# Patient Record
Sex: MALE | Race: WHITE | NOT HISPANIC OR LATINO | Employment: FULL TIME | URBAN - METROPOLITAN AREA
[De-identification: names, ages, dates, MRNs, and addresses within clinical notes are randomized per-mention and may not be internally consistent; named-entity substitution may affect disease eponyms.]

---

## 2018-04-16 PROBLEM — I10 ESSENTIAL HYPERTENSION: Status: ACTIVE | Noted: 2018-04-16

## 2020-02-04 PROBLEM — E66.811 CLASS 1 OBESITY DUE TO EXCESS CALORIES WITH SERIOUS COMORBIDITY AND BODY MASS INDEX (BMI) OF 30.0 TO 30.9 IN ADULT: Status: ACTIVE | Noted: 2020-02-04

## 2020-02-04 PROBLEM — F98.8 ATTENTION DEFICIT DISORDER (ADD) WITHOUT HYPERACTIVITY: Status: ACTIVE | Noted: 2020-02-04

## 2020-02-04 PROBLEM — Z87.891 PERSONAL HISTORY OF TOBACCO USE, PRESENTING HAZARDS TO HEALTH: Status: ACTIVE | Noted: 2020-02-04

## 2020-02-04 PROBLEM — N52.9 ERECTILE DYSFUNCTION: Status: ACTIVE | Noted: 2020-02-04

## 2020-02-04 PROBLEM — E66.09 CLASS 1 OBESITY DUE TO EXCESS CALORIES WITH SERIOUS COMORBIDITY AND BODY MASS INDEX (BMI) OF 30.0 TO 30.9 IN ADULT: Status: ACTIVE | Noted: 2020-02-04

## 2021-05-11 PROBLEM — M19.012 PRIMARY OSTEOARTHRITIS, LEFT SHOULDER: Status: ACTIVE | Noted: 2021-05-11

## 2022-05-09 PROBLEM — K40.90 LEFT INGUINAL HERNIA: Status: ACTIVE | Noted: 2022-05-09

## 2023-05-18 PROBLEM — E83.51 LOW CALCIUM LEVELS: Status: ACTIVE | Noted: 2023-05-18

## 2023-05-18 PROBLEM — D64.9 ANEMIA: Status: ACTIVE | Noted: 2023-05-18

## 2023-06-22 ENCOUNTER — TELEPHONE (OUTPATIENT)
Dept: HEMATOLOGY/ONCOLOGY | Facility: CLINIC | Age: 43
End: 2023-06-22
Payer: COMMERCIAL

## 2023-06-22 NOTE — TELEPHONE ENCOUNTER
Called pt and scheduled hem appt from referral.  Pt confirmed the appt and said will see in portal for address.

## 2023-06-30 ENCOUNTER — TELEPHONE (OUTPATIENT)
Dept: HEMATOLOGY/ONCOLOGY | Facility: CLINIC | Age: 43
End: 2023-06-30
Payer: COMMERCIAL

## 2023-06-30 NOTE — TELEPHONE ENCOUNTER
No voice msg set up, unable to lvm to inform the pt Dr Burch will not be in clinic for his July 7th appt.  Attempt to reach the pt to rs with another provider for a differ time and date.  Pt portal msg also sent.     Pt returned call and rescheduled hem appt.  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    Rescheduled with Dr Felipe July 11th at 1:20.  Pt confirmed the new appt date time and location.

## 2023-07-11 ENCOUNTER — LAB VISIT (OUTPATIENT)
Dept: LAB | Facility: HOSPITAL | Age: 43
End: 2023-07-11
Attending: INTERNAL MEDICINE
Payer: COMMERCIAL

## 2023-07-11 ENCOUNTER — OFFICE VISIT (OUTPATIENT)
Dept: HEMATOLOGY/ONCOLOGY | Facility: CLINIC | Age: 43
End: 2023-07-11
Payer: COMMERCIAL

## 2023-07-11 ENCOUNTER — TELEPHONE (OUTPATIENT)
Dept: HEMATOLOGY/ONCOLOGY | Facility: CLINIC | Age: 43
End: 2023-07-11
Payer: COMMERCIAL

## 2023-07-11 VITALS
SYSTOLIC BLOOD PRESSURE: 140 MMHG | DIASTOLIC BLOOD PRESSURE: 82 MMHG | OXYGEN SATURATION: 96 % | HEART RATE: 99 BPM | RESPIRATION RATE: 16 BRPM | BODY MASS INDEX: 32.46 KG/M2 | TEMPERATURE: 97 F | WEIGHT: 206.81 LBS | HEIGHT: 67 IN

## 2023-07-11 DIAGNOSIS — D64.9 ANEMIA, UNSPECIFIED TYPE: ICD-10-CM

## 2023-07-11 DIAGNOSIS — R79.89 ELEVATED FERRITIN: Primary | ICD-10-CM

## 2023-07-11 DIAGNOSIS — R79.89 ELEVATED FERRITIN: ICD-10-CM

## 2023-07-11 DIAGNOSIS — F98.8 ATTENTION DEFICIT DISORDER (ADD) WITHOUT HYPERACTIVITY: ICD-10-CM

## 2023-07-11 DIAGNOSIS — E53.8 B12 DEFICIENCY DUE TO DIET: ICD-10-CM

## 2023-07-11 DIAGNOSIS — E66.09 CLASS 1 OBESITY DUE TO EXCESS CALORIES WITH SERIOUS COMORBIDITY AND BODY MASS INDEX (BMI) OF 31.0 TO 31.9 IN ADULT: ICD-10-CM

## 2023-07-11 LAB
ALBUMIN SERPL BCP-MCNC: 4 G/DL (ref 3.5–5.2)
ALP SERPL-CCNC: 58 U/L (ref 55–135)
ALT SERPL W/O P-5'-P-CCNC: 22 U/L (ref 10–44)
ANION GAP SERPL CALC-SCNC: 10 MMOL/L (ref 8–16)
AST SERPL-CCNC: 20 U/L (ref 10–40)
BASOPHILS # BLD AUTO: 0.06 K/UL (ref 0–0.2)
BASOPHILS NFR BLD: 1.2 % (ref 0–1.9)
BILIRUB SERPL-MCNC: 0.4 MG/DL (ref 0.1–1)
BUN SERPL-MCNC: 13 MG/DL (ref 6–20)
CALCIUM SERPL-MCNC: 9.4 MG/DL (ref 8.7–10.5)
CHLORIDE SERPL-SCNC: 101 MMOL/L (ref 95–110)
CO2 SERPL-SCNC: 26 MMOL/L (ref 23–29)
CREAT SERPL-MCNC: 1.2 MG/DL (ref 0.5–1.4)
DIFFERENTIAL METHOD: ABNORMAL
EOSINOPHIL # BLD AUTO: 0.2 K/UL (ref 0–0.5)
EOSINOPHIL NFR BLD: 3.5 % (ref 0–8)
ERYTHROCYTE [DISTWIDTH] IN BLOOD BY AUTOMATED COUNT: 12.6 % (ref 11.5–14.5)
EST. GFR  (NO RACE VARIABLE): >60 ML/MIN/1.73 M^2
FERRITIN SERPL-MCNC: 392 NG/ML (ref 20–300)
FOLATE SERPL-MCNC: 2.8 NG/ML (ref 4–24)
GLUCOSE SERPL-MCNC: 94 MG/DL (ref 70–110)
HAPTOGLOB SERPL-MCNC: 125 MG/DL (ref 30–250)
HCT VFR BLD AUTO: 39.6 % (ref 40–54)
HGB BLD-MCNC: 14.1 G/DL (ref 14–18)
IMM GRANULOCYTES # BLD AUTO: 0 K/UL (ref 0–0.04)
IMM GRANULOCYTES NFR BLD AUTO: 0 % (ref 0–0.5)
LDH SERPL L TO P-CCNC: 144 U/L (ref 110–260)
LYMPHOCYTES # BLD AUTO: 1.7 K/UL (ref 1–4.8)
LYMPHOCYTES NFR BLD: 34.2 % (ref 18–48)
MCH RBC QN AUTO: 32.9 PG (ref 27–31)
MCHC RBC AUTO-ENTMCNC: 35.6 G/DL (ref 32–36)
MCV RBC AUTO: 92 FL (ref 82–98)
MONOCYTES # BLD AUTO: 0.4 K/UL (ref 0.3–1)
MONOCYTES NFR BLD: 7.4 % (ref 4–15)
NEUTROPHILS # BLD AUTO: 2.6 K/UL (ref 1.8–7.7)
NEUTROPHILS NFR BLD: 53.7 % (ref 38–73)
NRBC BLD-RTO: 0 /100 WBC
PATH REV BLD -IMP: NORMAL
PLATELET # BLD AUTO: 428 K/UL (ref 150–450)
PMV BLD AUTO: 9.1 FL (ref 9.2–12.9)
POTASSIUM SERPL-SCNC: 4.7 MMOL/L (ref 3.5–5.1)
PROT SERPL-MCNC: 7.4 G/DL (ref 6–8.4)
RBC # BLD AUTO: 4.29 M/UL (ref 4.6–6.2)
RETICS/RBC NFR AUTO: 1.3 % (ref 0.4–2)
SODIUM SERPL-SCNC: 137 MMOL/L (ref 136–145)
VIT B12 SERPL-MCNC: <148 PG/ML (ref 210–950)
WBC # BLD AUTO: 4.89 K/UL (ref 3.9–12.7)

## 2023-07-11 PROCEDURE — 86334 PATHOLOGIST INTERPRETATION IFE: ICD-10-PCS | Mod: 26,,, | Performed by: PATHOLOGY

## 2023-07-11 PROCEDURE — 84630 ASSAY OF ZINC: CPT | Performed by: STUDENT IN AN ORGANIZED HEALTH CARE EDUCATION/TRAINING PROGRAM

## 2023-07-11 PROCEDURE — 3044F PR MOST RECENT HEMOGLOBIN A1C LEVEL <7.0%: ICD-10-PCS | Mod: CPTII,S$GLB,, | Performed by: STUDENT IN AN ORGANIZED HEALTH CARE EDUCATION/TRAINING PROGRAM

## 2023-07-11 PROCEDURE — 83521 IG LIGHT CHAINS FREE EACH: CPT | Mod: 59 | Performed by: STUDENT IN AN ORGANIZED HEALTH CARE EDUCATION/TRAINING PROGRAM

## 2023-07-11 PROCEDURE — 99204 PR OFFICE/OUTPT VISIT, NEW, LEVL IV, 45-59 MIN: ICD-10-PCS | Mod: S$GLB,,, | Performed by: STUDENT IN AN ORGANIZED HEALTH CARE EDUCATION/TRAINING PROGRAM

## 2023-07-11 PROCEDURE — 83010 ASSAY OF HAPTOGLOBIN QUANT: CPT | Performed by: STUDENT IN AN ORGANIZED HEALTH CARE EDUCATION/TRAINING PROGRAM

## 2023-07-11 PROCEDURE — 84165 PATHOLOGIST INTERPRETATION SPE: ICD-10-PCS | Mod: 26,,, | Performed by: PATHOLOGY

## 2023-07-11 PROCEDURE — 3079F DIAST BP 80-89 MM HG: CPT | Mod: CPTII,S$GLB,, | Performed by: STUDENT IN AN ORGANIZED HEALTH CARE EDUCATION/TRAINING PROGRAM

## 2023-07-11 PROCEDURE — 84165 PROTEIN E-PHORESIS SERUM: CPT | Performed by: STUDENT IN AN ORGANIZED HEALTH CARE EDUCATION/TRAINING PROGRAM

## 2023-07-11 PROCEDURE — 3077F PR MOST RECENT SYSTOLIC BLOOD PRESSURE >= 140 MM HG: ICD-10-PCS | Mod: CPTII,S$GLB,, | Performed by: STUDENT IN AN ORGANIZED HEALTH CARE EDUCATION/TRAINING PROGRAM

## 2023-07-11 PROCEDURE — 1159F MED LIST DOCD IN RCRD: CPT | Mod: CPTII,S$GLB,, | Performed by: STUDENT IN AN ORGANIZED HEALTH CARE EDUCATION/TRAINING PROGRAM

## 2023-07-11 PROCEDURE — 99999 PR PBB SHADOW E&M-EST. PATIENT-LVL V: CPT | Mod: PBBFAC,,, | Performed by: STUDENT IN AN ORGANIZED HEALTH CARE EDUCATION/TRAINING PROGRAM

## 2023-07-11 PROCEDURE — 3008F BODY MASS INDEX DOCD: CPT | Mod: CPTII,S$GLB,, | Performed by: STUDENT IN AN ORGANIZED HEALTH CARE EDUCATION/TRAINING PROGRAM

## 2023-07-11 PROCEDURE — 82746 ASSAY OF FOLIC ACID SERUM: CPT | Performed by: STUDENT IN AN ORGANIZED HEALTH CARE EDUCATION/TRAINING PROGRAM

## 2023-07-11 PROCEDURE — 81256 HFE GENE: CPT | Performed by: STUDENT IN AN ORGANIZED HEALTH CARE EDUCATION/TRAINING PROGRAM

## 2023-07-11 PROCEDURE — 83615 LACTATE (LD) (LDH) ENZYME: CPT | Mod: PN | Performed by: STUDENT IN AN ORGANIZED HEALTH CARE EDUCATION/TRAINING PROGRAM

## 2023-07-11 PROCEDURE — 86334 IMMUNOFIX E-PHORESIS SERUM: CPT | Performed by: STUDENT IN AN ORGANIZED HEALTH CARE EDUCATION/TRAINING PROGRAM

## 2023-07-11 PROCEDURE — 85025 COMPLETE CBC W/AUTO DIFF WBC: CPT | Mod: PN | Performed by: STUDENT IN AN ORGANIZED HEALTH CARE EDUCATION/TRAINING PROGRAM

## 2023-07-11 PROCEDURE — 4010F PR ACE/ARB THEARPY RXD/TAKEN: ICD-10-PCS | Mod: CPTII,S$GLB,, | Performed by: STUDENT IN AN ORGANIZED HEALTH CARE EDUCATION/TRAINING PROGRAM

## 2023-07-11 PROCEDURE — 84165 PROTEIN E-PHORESIS SERUM: CPT | Mod: 26,,, | Performed by: PATHOLOGY

## 2023-07-11 PROCEDURE — 85060 PATHOLOGIST REVIEW: ICD-10-PCS | Mod: ,,, | Performed by: PATHOLOGY

## 2023-07-11 PROCEDURE — 80053 COMPREHEN METABOLIC PANEL: CPT | Mod: PN | Performed by: STUDENT IN AN ORGANIZED HEALTH CARE EDUCATION/TRAINING PROGRAM

## 2023-07-11 PROCEDURE — 86334 IMMUNOFIX E-PHORESIS SERUM: CPT | Mod: 26,,, | Performed by: PATHOLOGY

## 2023-07-11 PROCEDURE — 4010F ACE/ARB THERAPY RXD/TAKEN: CPT | Mod: CPTII,S$GLB,, | Performed by: STUDENT IN AN ORGANIZED HEALTH CARE EDUCATION/TRAINING PROGRAM

## 2023-07-11 PROCEDURE — 1159F PR MEDICATION LIST DOCUMENTED IN MEDICAL RECORD: ICD-10-PCS | Mod: CPTII,S$GLB,, | Performed by: STUDENT IN AN ORGANIZED HEALTH CARE EDUCATION/TRAINING PROGRAM

## 2023-07-11 PROCEDURE — 99204 OFFICE O/P NEW MOD 45 MIN: CPT | Mod: S$GLB,,, | Performed by: STUDENT IN AN ORGANIZED HEALTH CARE EDUCATION/TRAINING PROGRAM

## 2023-07-11 PROCEDURE — 36415 COLL VENOUS BLD VENIPUNCTURE: CPT | Mod: PN | Performed by: STUDENT IN AN ORGANIZED HEALTH CARE EDUCATION/TRAINING PROGRAM

## 2023-07-11 PROCEDURE — 3077F SYST BP >= 140 MM HG: CPT | Mod: CPTII,S$GLB,, | Performed by: STUDENT IN AN ORGANIZED HEALTH CARE EDUCATION/TRAINING PROGRAM

## 2023-07-11 PROCEDURE — 82607 VITAMIN B-12: CPT | Performed by: STUDENT IN AN ORGANIZED HEALTH CARE EDUCATION/TRAINING PROGRAM

## 2023-07-11 PROCEDURE — 85060 BLOOD SMEAR INTERPRETATION: CPT | Mod: ,,, | Performed by: PATHOLOGY

## 2023-07-11 PROCEDURE — 85045 AUTOMATED RETICULOCYTE COUNT: CPT | Mod: PN | Performed by: STUDENT IN AN ORGANIZED HEALTH CARE EDUCATION/TRAINING PROGRAM

## 2023-07-11 PROCEDURE — 82728 ASSAY OF FERRITIN: CPT | Performed by: STUDENT IN AN ORGANIZED HEALTH CARE EDUCATION/TRAINING PROGRAM

## 2023-07-11 PROCEDURE — 3079F PR MOST RECENT DIASTOLIC BLOOD PRESSURE 80-89 MM HG: ICD-10-PCS | Mod: CPTII,S$GLB,, | Performed by: STUDENT IN AN ORGANIZED HEALTH CARE EDUCATION/TRAINING PROGRAM

## 2023-07-11 PROCEDURE — 3044F HG A1C LEVEL LT 7.0%: CPT | Mod: CPTII,S$GLB,, | Performed by: STUDENT IN AN ORGANIZED HEALTH CARE EDUCATION/TRAINING PROGRAM

## 2023-07-11 PROCEDURE — 82525 ASSAY OF COPPER: CPT | Performed by: STUDENT IN AN ORGANIZED HEALTH CARE EDUCATION/TRAINING PROGRAM

## 2023-07-11 PROCEDURE — 1160F RVW MEDS BY RX/DR IN RCRD: CPT | Mod: CPTII,S$GLB,, | Performed by: STUDENT IN AN ORGANIZED HEALTH CARE EDUCATION/TRAINING PROGRAM

## 2023-07-11 PROCEDURE — 1160F PR REVIEW ALL MEDS BY PRESCRIBER/CLIN PHARMACIST DOCUMENTED: ICD-10-PCS | Mod: CPTII,S$GLB,, | Performed by: STUDENT IN AN ORGANIZED HEALTH CARE EDUCATION/TRAINING PROGRAM

## 2023-07-11 PROCEDURE — 3008F PR BODY MASS INDEX (BMI) DOCUMENTED: ICD-10-PCS | Mod: CPTII,S$GLB,, | Performed by: STUDENT IN AN ORGANIZED HEALTH CARE EDUCATION/TRAINING PROGRAM

## 2023-07-11 PROCEDURE — 99999 PR PBB SHADOW E&M-EST. PATIENT-LVL V: ICD-10-PCS | Mod: PBBFAC,,, | Performed by: STUDENT IN AN ORGANIZED HEALTH CARE EDUCATION/TRAINING PROGRAM

## 2023-07-11 NOTE — PROGRESS NOTES
Subjective:                                                                                    Consult note   Patient ID:    Name: Jaylon Crocker  : 1980  MRN: [id number]      HPI:   Jaylon Crocker is a 42 y.o. male presents for evaluation of Other (Anemia /)    Patient was referred for anemia but with elevated ferritin, ~ 500's, on further discussion patient  is having generalized weakness and fatigued, we discussed in details his symptomsn, no bleeding, no bruising, but he does have a significant history of drinking alcohol every other week.     Past Medical History:   Diagnosis Date    ADHD (attention deficit hyperactivity disorder)     Hypertension        Past Surgical History:   Procedure Laterality Date    MYRINGOTOMY W/ TUBES      ROBOT-ASSISTED LAPAROSCOPIC REPAIR OF INGUINAL HERNIA USING DA KORI XI Left 3/28/2023    Procedure: XI ROBOTIC REPAIR, HERNIA, INGUINAL;  Surgeon: Gerardo Scott MD;  Location: [facility];  Service: General;  Laterality: Left;       Family History   Problem Relation Age of Onset    Cancer Mother     Hyperlipidemia Father     No Known Problems Sister     No Known Problems Brother        Social History     Socioeconomic History    Marital status:    Tobacco Use    Smoking status: Every Day     Packs/day: 0.50     Types: Cigarettes    Smokeless tobacco: Never   Substance and Sexual Activity    Alcohol use: Yes     Alcohol/week: 6.0 standard drinks     Types: 6 Cans of beer per week    Drug use: Never    Sexual activity: Yes     Partners: Female       Review of patient's allergies indicates:  No Known Allergies    Review of Systems   Constitutional: Negative for decreased appetite.   Eyes:  Negative for visual disturbance.   Cardiovascular:  Negative for chest pain.   Respiratory:  Negative for cough and shortness of breath.    Hematologic/Lymphatic: Negative for adenopathy.   Skin:  Negative for rash.   Musculoskeletal:  Negative for back pain.  "  Gastrointestinal:  Negative for abdominal pain and diarrhea.   Genitourinary:  Negative for frequency.   Neurological:  Negative for headaches.   Psychiatric/Behavioral:  The patient is not nervous/anxious.           Objective:     Vitals:    07/11/23 1328   BP: (!) 140/82   BP Location: Right arm   Patient Position: Sitting   BP Method: Medium (Automatic)   Pulse: 99   Resp: 16   Temp: 97.2 °F (36.2 °C)   TempSrc: Temporal   SpO2: 96%   Weight: 93.8 kg (206 lb 12.7 oz)   Height: 5' 7" (1.702 m)        Physical Exam  Physical Exam  Constitutional:       General: not in acute distress.     Appearance: Normal appearance.  normal weight.   HENT:      Head: Normocephalic and atraumatic.   Cardiovascular:      Rate and Rhythm: Normal rate and regular rhythm.      Heart sounds: Normal heart sounds.   Pulmonary:      Effort: Pulmonary effort is normal.      Breath sounds: Normal breath sounds. No wheezing.   Chest:      Chest wall: No tenderness.   Abdominal:      General: Abdomen is flat. Bowel sounds are normal. There is no distension.      Palpations: Abdomen is soft. There is no mass.   Musculoskeletal:      Right lower leg: No edema.   Lymphadenopathy:      Cervical: No cervical adenopathy.   Skin:     Coloration: Skin is not jaundiced or pale.   Psychiatric:         Mood and Affect: Mood normal.         Behavior: Behavior normal.         Current Outpatient Medications on File Prior to Visit   Medication Sig    amLODIPine (NORVASC) 10 MG tablet TAKE ONE TABLET BY MOUTH ONCE DAILY    carvediloL (COREG) 6.25 MG tablet TAKE ONE TABLET BY MOUTH TWICE DAILY WITH meals    dextroamphetamine-amphetamine 30 mg Tab TAKE ONE TABLET (30MG) BY MOUTH TWICE DAILY    ergocalciferol (ERGOCALCIFEROL) 50,000 unit Cap Take 1 capsule (50,000 Units total) by mouth every 7 days.    losartan (COZAAR) 100 MG tablet Take 1 tablet (100 mg total) by mouth once daily.    omeprazole (PRILOSEC) 20 MG capsule Take 1 capsule (20 mg total) by mouth " once daily.    tadalafiL (CIALIS) 20 MG Tab Take 1 tablet (20 mg total) by mouth once daily.     No current facility-administered medications on file prior to visit.       CBC:  Lab Results   Component Value Date    WBC 4.89 07/11/2023    HGB 14.1 07/11/2023    HCT 39.6 (L) 07/11/2023    MCV 92 07/11/2023     07/11/2023         CMP:  Sodium   Date Value Ref Range Status   07/11/2023 137 136 - 145 mmol/L Final     Potassium   Date Value Ref Range Status   07/11/2023 4.7 3.5 - 5.1 mmol/L Final     Chloride   Date Value Ref Range Status   07/11/2023 101 95 - 110 mmol/L Final     CO2   Date Value Ref Range Status   07/11/2023 26 23 - 29 mmol/L Final     Glucose   Date Value Ref Range Status   07/11/2023 94 70 - 110 mg/dL Final     BUN   Date Value Ref Range Status   07/11/2023 13 6 - 20 mg/dL Final     Creatinine   Date Value Ref Range Status   07/11/2023 1.2 0.5 - 1.4 mg/dL Final     Calcium   Date Value Ref Range Status   07/11/2023 9.4 8.7 - 10.5 mg/dL Final     Total Protein   Date Value Ref Range Status   07/11/2023 7.4 6.0 - 8.4 g/dL Final     Albumin   Date Value Ref Range Status   07/11/2023 4.0 3.5 - 5.2 g/dL Final     Total Bilirubin   Date Value Ref Range Status   07/11/2023 0.4 0.1 - 1.0 mg/dL Final     Comment:     For infants and newborns, interpretation of results should be based  on gestational age, weight and in agreement with clinical  observations.    Premature Infant recommended reference ranges:  Up to 24 hours.............<8.0 mg/dL  Up to 48 hours............<12.0 mg/dL  3-5 days..................<15.0 mg/dL  6-29 days.................<15.0 mg/dL       Alkaline Phosphatase   Date Value Ref Range Status   07/11/2023 58 55 - 135 U/L Final     AST   Date Value Ref Range Status   07/11/2023 20 10 - 40 U/L Final     ALT   Date Value Ref Range Status   07/11/2023 22 10 - 44 U/L Final     Anion Gap   Date Value Ref Range Status   07/11/2023 10 8 - 16 mmol/L Final     eGFR if     Date Value Ref Range Status   04/17/2018 >60 >60 mL/min/1.73 m^2 Final     eGFR if non    Date Value Ref Range Status   04/17/2018 >60 >60 mL/min/1.73 m^2 Final     Comment:     Calculation used to obtain the estimated glomerular filtration  rate (eGFR) is the CKD-EPI equation.          Lab Results   Component Value Date    IRON 166 (H) 06/19/2023    TIBC 283 06/19/2023    FERRITIN 392 (H) 07/11/2023       Lab Results   Component Value Date    IJUMOKRE00 <148 (L) 07/11/2023   ,  Lab Results   Component Value Date    FOLATE 2.8 (L) 07/11/2023       MRI Shoulder Without Contrast Left  Narrative: EXAMINATION:  MRI SHOULDER WITHOUT CONTRAST LEFT    CLINICAL HISTORY:  Left shoulder injury 6 years ago.  Chronic pain since.  Fifteen months ago pain increased.    TECHNIQUE:  Multiplanar multisequence noncontrast MRI of the left shoulder.    COMPARISON:  Radiographs 03/23/2021    FINDINGS:  There is mild supraspinatus tendinopathy.  No definite tear is identified.  The infraspinatus tendon appears intact.  The teres minor tendon appears intact.  The subscapularis tendon appears intact.  There is tendinopathy long head of biceps tendon.  The tendon is normally located within the bicipital groove.  There is superior labral tear SLAP type appears to extend into the biceps anchor.  There also does appear to be anterior, posterior, and likely inferior labral tearing.    There is large osteophyte arising from the inferior humeral head at the glenohumeral articulation.  There is severe chondromalacia with near bone on bone inferior glenohumeral joint.  There is subcortical cystic change within the glenoid and within the humeral head predominating within the osteophyte.  Additionally subcortical cystic changes are identified posterior humeral head and at the superior humeral head medially.  There is subchondral marrow edema.  There is no acute displaced fracture or dislocation.  There is no Hill-Sachs deformity.   There is small glenohumeral joint effusion.  There is moderate AC joint osteoarthritis.  No significant muscle atrophy is noted.  No subacromial subdeltoid bursitis is noted.  Impression: 1. Severe osteoarthritis of the glenohumeral joint noted.  2. Moderate osteoarthritis of the acromioclavicular joint.  3. Suspected superior labral tear SLAP type appears to extend into the biceps anchor.  There is also suspected anterior, posterior, and likely inferior labral tearing.  4. Long head biceps tendinopathy.  5. Mild supraspinatus tendinopathy.  6. Small glenohumeral joint effusion.  7. Additional findings as above.    Electronically signed by: Balbir Winston MD  Date:    04/09/2021  Time:    14:50       All pertinent labs and imaging reviewed.    Assessment:       1. Elevated ferritin    2. Anemia, unspecified type    3. Class 1 obesity due to excess calories with serious comorbidity and body mass index (BMI) of 31.0 to 31.9 in adult    4. Attention deficit disorder (ADD) without hyperactivity        # Mild anemia with elevated ferritin  - less likely XOCHITL and more likely due to his underlying liver/alcohol consumption   - discussed in details we will check nutritional/MM and blood work as below   - we will get ultrasound of the liver, ruling out cirrhosis     # ADD: on medication, following up with pcp   #HTN: on medication, stable,   Plan:     Elevated ferritin  -     CBC w/ DIFF; Future; Expected date: 07/11/2023  -     HEMOCHROMATOSIS DNA ANALYSIS (PCR); Future; Expected date: 07/11/2023  -     CMP; Future; Expected date: 07/11/2023  -     FERRITIN; Future; Expected date: 07/11/2023  -     LDH; Future; Expected date: 07/11/2023  -     HAPTOGLOBIN; Future; Expected date: 07/11/2023  -     Pathologist Interpretation Differential; Future; Expected date: 07/11/2023  -     SPEP - Protein electrophoresis, serum; Future; Expected date: 07/11/2023  -     ROSS; Future; Expected date: 07/11/2023  -     Immunoglobulin Free LT  Chains Blood; Future; Expected date: 07/11/2023  -     Zinc; Future; Expected date: 07/11/2023  -     COPPER, SERUM; Future; Expected date: 07/11/2023  -     B-12; Future; Expected date: 07/11/2023  -     FOLATE; Future; Expected date: 07/11/2023  -     Reticulocytes; Future; Expected date: 07/11/2023  -     US Abdomen Limited; Future; Expected date: 07/11/2023    Anemia, unspecified type  -     Ambulatory referral/consult to Hematology / Oncology  -     CBC w/ DIFF; Future; Expected date: 07/11/2023  -     HEMOCHROMATOSIS DNA ANALYSIS (PCR); Future; Expected date: 07/11/2023  -     CMP; Future; Expected date: 07/11/2023  -     FERRITIN; Future; Expected date: 07/11/2023  -     LDH; Future; Expected date: 07/11/2023  -     HAPTOGLOBIN; Future; Expected date: 07/11/2023  -     Pathologist Interpretation Differential; Future; Expected date: 07/11/2023  -     SPEP - Protein electrophoresis, serum; Future; Expected date: 07/11/2023  -     ROSS; Future; Expected date: 07/11/2023  -     Immunoglobulin Free LT Chains Blood; Future; Expected date: 07/11/2023  -     Zinc; Future; Expected date: 07/11/2023  -     COPPER, SERUM; Future; Expected date: 07/11/2023  -     B-12; Future; Expected date: 07/11/2023  -     FOLATE; Future; Expected date: 07/11/2023  -     Reticulocytes; Future; Expected date: 07/11/2023  -     US Abdomen Limited; Future; Expected date: 07/11/2023    Class 1 obesity due to excess calories with serious comorbidity and body mass index (BMI) of 31.0 to 31.9 in adult    Attention deficit disorder (ADD) without hyperactivity               Patient queried and all questions were answered.    Recommend Advance Care planning/ directives /living will/patient's wishes  being documented.    Recommend COVID guidelines being followed   Recommend  exercise, nutrition and weight management and health maintenance activities and follow-up with PCPs recommendations       Route Chart for Scheduling    Med Onc Chart  Routing      Follow up with physician . Virtual visit in 2-3 weeks, blood work today, needs ultrasound prior to next thelma   Follow up with [facility]    Infusion scheduling note    Injection scheduling note    Labs    Imaging    Pharmacy appointment    Other referrals                 Signed:  Cherelle Felipe MD  Hematology and Oncology  [facility] [place]  [facility] [place] [facility]    Answers submitted by the patient for this visit:  Review of Systems Questionnaire (Submitted on 7/11/2023)  appetite change : No  unexpected weight change: No  mouth sores: No

## 2023-07-12 LAB
ALBUMIN SERPL ELPH-MCNC: 4.49 G/DL (ref 3.35–5.55)
ALPHA1 GLOB SERPL ELPH-MCNC: 0.27 G/DL (ref 0.17–0.41)
ALPHA2 GLOB SERPL ELPH-MCNC: 0.64 G/DL (ref 0.43–0.99)
B-GLOBULIN SERPL ELPH-MCNC: 0.84 G/DL (ref 0.5–1.1)
GAMMA GLOB SERPL ELPH-MCNC: 0.86 G/DL (ref 0.67–1.58)
INTERPRETATION SERPL IFE-IMP: NORMAL
KAPPA LC SER QL IA: 1.85 MG/DL (ref 0.33–1.94)
KAPPA LC/LAMBDA SER IA: 1.07 (ref 0.26–1.65)
LAMBDA LC SER QL IA: 1.73 MG/DL (ref 0.57–2.63)
PATH REV BLD -IMP: NORMAL
PATHOLOGIST INTERPRETATION IFE: NORMAL
PATHOLOGIST INTERPRETATION SPE: NORMAL
PROT SERPL-MCNC: 7.1 G/DL (ref 6–8.4)

## 2023-07-14 LAB
COPPER SERPL-MCNC: 893 UG/L (ref 665–1480)
ZINC SERPL-MCNC: 84 UG/DL (ref 60–130)

## 2023-07-17 LAB
GENETICIST REVIEW: NORMAL
HFE GENE MUT ANL BLD/T: NORMAL
HFE RELEASED BY: NORMAL
HFE RESULT SUMMARY: NEGATIVE
REF LAB TEST METHOD: NORMAL
SPECIMEN SOURCE: NORMAL
SPECIMEN,  HEMOCHROMATOSIS: NORMAL

## 2023-07-24 ENCOUNTER — PATIENT MESSAGE (OUTPATIENT)
Dept: HEMATOLOGY/ONCOLOGY | Facility: CLINIC | Age: 43
End: 2023-07-24
Payer: COMMERCIAL

## 2023-07-24 RX ORDER — FOLIC ACID 1 MG/1
1 TABLET ORAL DAILY
Qty: 90 TABLET | Refills: 3 | Status: SHIPPED | OUTPATIENT
Start: 2023-07-24

## 2023-07-24 RX ORDER — CYANOCOBALAMIN 1000 UG/ML
1000 INJECTION, SOLUTION INTRAMUSCULAR; SUBCUTANEOUS ONCE
Status: SHIPPED | OUTPATIENT
Start: 2023-07-24

## 2023-07-24 RX ORDER — MAGNESIUM 200 MG
1000 TABLET ORAL DAILY
Qty: 90 TABLET | Refills: 3 | Status: SHIPPED | OUTPATIENT
Start: 2023-07-24

## 2023-08-02 ENCOUNTER — HOSPITAL ENCOUNTER (OUTPATIENT)
Dept: RADIOLOGY | Facility: HOSPITAL | Age: 43
Discharge: HOME | End: 2023-08-02
Attending: STUDENT IN AN ORGANIZED HEALTH CARE EDUCATION/TRAINING PROGRAM
Payer: COMMERCIAL

## 2023-08-02 DIAGNOSIS — R79.89 ELEVATED FERRITIN: ICD-10-CM

## 2023-08-02 DIAGNOSIS — D64.9 ANEMIA, UNSPECIFIED TYPE: ICD-10-CM

## 2023-08-02 PROCEDURE — 76705 ECHO EXAM OF ABDOMEN: CPT | Mod: 26,,, | Performed by: RADIOLOGY

## 2023-08-02 PROCEDURE — 76705 ECHO EXAM OF ABDOMEN: CPT | Mod: TC,PO

## 2023-08-02 PROCEDURE — 76705 US ABDOMEN LIMITED: ICD-10-PCS | Mod: 26,,, | Performed by: RADIOLOGY

## 2023-08-08 ENCOUNTER — OFFICE VISIT (OUTPATIENT)
Dept: HEMATOLOGY/ONCOLOGY | Facility: CLINIC | Age: 43
End: 2023-08-08
Payer: COMMERCIAL

## 2023-08-08 DIAGNOSIS — E53.8 FOLATE DEFICIENCY: ICD-10-CM

## 2023-08-08 DIAGNOSIS — E53.8 B12 DEFICIENCY: ICD-10-CM

## 2023-08-08 DIAGNOSIS — R79.89 ELEVATED FERRITIN: Primary | ICD-10-CM

## 2023-08-08 PROCEDURE — 99214 OFFICE O/P EST MOD 30 MIN: CPT | Mod: 95,,, | Performed by: STUDENT IN AN ORGANIZED HEALTH CARE EDUCATION/TRAINING PROGRAM

## 2023-08-08 PROCEDURE — 1160F PR REVIEW ALL MEDS BY PRESCRIBER/CLIN PHARMACIST DOCUMENTED: ICD-10-PCS | Mod: CPTII,95,, | Performed by: STUDENT IN AN ORGANIZED HEALTH CARE EDUCATION/TRAINING PROGRAM

## 2023-08-08 PROCEDURE — 1159F MED LIST DOCD IN RCRD: CPT | Mod: CPTII,95,, | Performed by: STUDENT IN AN ORGANIZED HEALTH CARE EDUCATION/TRAINING PROGRAM

## 2023-08-08 PROCEDURE — 1159F PR MEDICATION LIST DOCUMENTED IN MEDICAL RECORD: ICD-10-PCS | Mod: CPTII,95,, | Performed by: STUDENT IN AN ORGANIZED HEALTH CARE EDUCATION/TRAINING PROGRAM

## 2023-08-08 PROCEDURE — 3044F HG A1C LEVEL LT 7.0%: CPT | Mod: CPTII,95,, | Performed by: STUDENT IN AN ORGANIZED HEALTH CARE EDUCATION/TRAINING PROGRAM

## 2023-08-08 PROCEDURE — 4010F PR ACE/ARB THEARPY RXD/TAKEN: ICD-10-PCS | Mod: CPTII,95,, | Performed by: STUDENT IN AN ORGANIZED HEALTH CARE EDUCATION/TRAINING PROGRAM

## 2023-08-08 PROCEDURE — 4010F ACE/ARB THERAPY RXD/TAKEN: CPT | Mod: CPTII,95,, | Performed by: STUDENT IN AN ORGANIZED HEALTH CARE EDUCATION/TRAINING PROGRAM

## 2023-08-08 PROCEDURE — 3044F PR MOST RECENT HEMOGLOBIN A1C LEVEL <7.0%: ICD-10-PCS | Mod: CPTII,95,, | Performed by: STUDENT IN AN ORGANIZED HEALTH CARE EDUCATION/TRAINING PROGRAM

## 2023-08-08 PROCEDURE — 99214 PR OFFICE/OUTPT VISIT, EST, LEVL IV, 30-39 MIN: ICD-10-PCS | Mod: 95,,, | Performed by: STUDENT IN AN ORGANIZED HEALTH CARE EDUCATION/TRAINING PROGRAM

## 2023-08-08 PROCEDURE — 1160F RVW MEDS BY RX/DR IN RCRD: CPT | Mod: CPTII,95,, | Performed by: STUDENT IN AN ORGANIZED HEALTH CARE EDUCATION/TRAINING PROGRAM

## 2023-08-08 NOTE — PROGRESS NOTES
Subjective:     The patient location is: home   The chief complaint leading to consultation is: discussed his blood results     Visit type: audiovisual    Face to Face time with patient: 31 minutes of total time spent on the encounter, which includes face to face time and non-face to face time preparing to see the patient (eg, review of tests), Obtaining and/or reviewing separately obtained history, Documenting clinical information in the electronic or other health record, Independently interpreting results (not separately reported) and communicating results to the patient/family/caregiver, or Care coordination (not separately reported).     Each patient to whom he or she provides medical services by telemedicine is:  (1) informed of the relationship between the physician and patient and the respective role of any other health care provider with respect to management of the patient; and (2) notified that he or she may decline to receive medical services by telemedicine and may withdraw from such care at any time.    Notes:         Patient ID:    Name: Jaylon Crocker  : 1980  MRN: [id number]      HPI:   Jaylon Crocker is a 42 y.o. male presents for evaluation of Elevated ferritin    Patient was referred for anemia but with elevated ferritin, ~ 500's, on further discussion patient  is having generalized weakness and fatigued, we discussed in details his symptomsn, no bleeding, no bruising, but he does have a significant history of drinking alcohol every other week.     Today, we discussed his blood work results in details, concerning of possible b12/folate deficiency leading to anemia from alcohol as well as liver cirrhosis in his US     Past Medical History:   Diagnosis Date    ADHD (attention deficit hyperactivity disorder)     Hypertension        Past Surgical History:   Procedure Laterality Date    MYRINGOTOMY W/ TUBES      ROBOT-ASSISTED LAPAROSCOPIC REPAIR OF INGUINAL HERNIA USING DA KORI XI Left  3/28/2023    Procedure: XI ROBOTIC REPAIR, HERNIA, INGUINAL;  Surgeon: Gerardo Scott MD;  Location: [facility] OR;  Service: General;  Laterality: Left;       Family History   Problem Relation Age of Onset    Cancer Mother     Hyperlipidemia Father     No Known Problems Sister     No Known Problems Brother        Social History     Socioeconomic History    Marital status:    Tobacco Use    Smoking status: Every Day     Current packs/day: 0.50     Types: Cigarettes    Smokeless tobacco: Never   Substance and Sexual Activity    Alcohol use: Yes     Alcohol/week: 6.0 standard drinks of alcohol     Types: 6 Cans of beer per week    Drug use: Never    Sexual activity: Yes     Partners: Female       Review of patient's allergies indicates:  No Known Allergies    Review of Systems   Constitutional: Negative for decreased appetite.   Eyes:  Negative for visual disturbance.   Cardiovascular:  Negative for chest pain.   Respiratory:  Negative for cough and shortness of breath.    Hematologic/Lymphatic: Negative for adenopathy.   Skin:  Negative for rash.   Musculoskeletal:  Negative for back pain.   Gastrointestinal:  Negative for abdominal pain and diarrhea.   Genitourinary:  Negative for frequency.   Neurological:  Negative for headaches.   Psychiatric/Behavioral:  The patient is not nervous/anxious.             Objective:     There were no vitals filed for this visit.       Physical Exam  Physical Exam  Constitutional:       General: not in acute distress.     Appearance: Normal appearance.  normal weight.   HENT:      Head: Normocephalic and atraumatic.   Cardiovascular:      Rate and Rhythm: Normal rate and regular rhythm.      Heart sounds: Normal heart sounds.   Pulmonary:      Effort: Pulmonary effort is normal.      Breath sounds: Normal breath sounds. No wheezing.   Chest:      Chest wall: No tenderness.   Abdominal:      General: Abdomen is flat. Bowel sounds are normal. There is no distension.       Palpations: Abdomen is soft. There is no mass.   Musculoskeletal:      Right lower leg: No edema.   Lymphadenopathy:      Cervical: No cervical adenopathy.   Skin:     Coloration: Skin is not jaundiced or pale.   Psychiatric:         Mood and Affect: Mood normal.         Behavior: Behavior normal.         Current Outpatient Medications on File Prior to Visit   Medication Sig    amLODIPine (NORVASC) 10 MG tablet TAKE ONE TABLET BY MOUTH ONCE DAILY    carvediloL (COREG) 6.25 MG tablet TAKE ONE TABLET BY MOUTH TWICE DAILY WITH meals    cyanocobalamin, vitamin B-12, 1,000 mcg Subl Place 1,000 mcg under the tongue once daily.    dextroamphetamine-amphetamine 30 mg Tab TAKE ONE TABLET (30MG) BY MOUTH TWICE DAILY    ergocalciferol (ERGOCALCIFEROL) 50,000 unit Cap Take 1 capsule (50,000 Units total) by mouth every 7 days.    folic acid (FOLVITE) 1 MG tablet Take 1 tablet (1 mg total) by mouth once daily.    losartan (COZAAR) 100 MG tablet Take 1 tablet (100 mg total) by mouth once daily.    omeprazole (PRILOSEC) 20 MG capsule Take 1 capsule (20 mg total) by mouth once daily.    tadalafiL (CIALIS) 20 MG Tab Take 1 tablet (20 mg total) by mouth once daily.     Current Facility-Administered Medications on File Prior to Visit   Medication    cyanocobalamin injection 1,000 mcg       CBC:  Lab Results   Component Value Date    WBC 4.89 07/11/2023    HGB 14.1 07/11/2023    HCT 39.6 (L) 07/11/2023    MCV 92 07/11/2023     07/11/2023         CMP:  Sodium   Date Value Ref Range Status   07/11/2023 137 136 - 145 mmol/L Final     Potassium   Date Value Ref Range Status   07/11/2023 4.7 3.5 - 5.1 mmol/L Final     Chloride   Date Value Ref Range Status   07/11/2023 101 95 - 110 mmol/L Final     CO2   Date Value Ref Range Status   07/11/2023 26 23 - 29 mmol/L Final     Glucose   Date Value Ref Range Status   07/11/2023 94 70 - 110 mg/dL Final     BUN   Date Value Ref Range Status   07/11/2023 13 6 - 20 mg/dL Final     Creatinine    Date Value Ref Range Status   07/11/2023 1.2 0.5 - 1.4 mg/dL Final     Calcium   Date Value Ref Range Status   07/11/2023 9.4 8.7 - 10.5 mg/dL Final     Total Protein   Date Value Ref Range Status   07/11/2023 7.4 6.0 - 8.4 g/dL Final     Albumin   Date Value Ref Range Status   07/11/2023 4.0 3.5 - 5.2 g/dL Final     Total Bilirubin   Date Value Ref Range Status   07/11/2023 0.4 0.1 - 1.0 mg/dL Final     Comment:     For infants and newborns, interpretation of results should be based  on gestational age, weight and in agreement with clinical  observations.    Premature Infant recommended reference ranges:  Up to 24 hours.............<8.0 mg/dL  Up to 48 hours............<12.0 mg/dL  3-5 days..................<15.0 mg/dL  6-29 days.................<15.0 mg/dL       Alkaline Phosphatase   Date Value Ref Range Status   07/11/2023 58 55 - 135 U/L Final     AST   Date Value Ref Range Status   07/11/2023 20 10 - 40 U/L Final     ALT   Date Value Ref Range Status   07/11/2023 22 10 - 44 U/L Final     Anion Gap   Date Value Ref Range Status   07/11/2023 10 8 - 16 mmol/L Final     eGFR if    Date Value Ref Range Status   04/17/2018 >60 >60 mL/min/1.73 m^2 Final     eGFR if non    Date Value Ref Range Status   04/17/2018 >60 >60 mL/min/1.73 m^2 Final     Comment:     Calculation used to obtain the estimated glomerular filtration  rate (eGFR) is the CKD-EPI equation.          Lab Results   Component Value Date    IRON 166 (H) 06/19/2023    TIBC 283 06/19/2023    FERRITIN 392 (H) 07/11/2023       Lab Results   Component Value Date    KYVJGHSJ18 <148 (L) 07/11/2023   ,  Lab Results   Component Value Date    FOLATE 2.8 (L) 07/11/2023       US Abdomen Limited  Narrative: EXAMINATION:  US ABDOMEN LIMITED    CLINICAL HISTORY:  Anemia, unspecified    TECHNIQUE:  Limited ultrasound of the right upper quadrant of the abdomen (including pancreas, liver, gallbladder, common bile duct, and spleen) was  performed.    COMPARISON:  None.    FINDINGS:  The pancreas demonstrates no obvious abnormality in its partially visualized portions.  The inferior vena cava is favored to be patent at the level liver but is not ideally displayed.  The gallbladder demonstrates no evidence of gallbladder wall thickening, pericholecystic fluid, or reported sonographic Freed sign.  A common bile duct of 2 mm is noted which is within normal limits.  The liver measures 18.3 cm and demonstrates no obvious focal lesion.  The main portal vein appears patent.  The spleen measures 10.0 cm and demonstrates no obvious focal lesion.  The liver contour appears somewhat irregular which may be physiologic but could be seen with liver disease and/or cirrhosis.  Please clinically correlate.  The liver size is slightly prominent.  The hepatic confluence is not ideally displayed  Impression: 1. Liver size is slightly prominent with a slightly irregular contour to the liver, this could be physiologic or relate to liver disease such as cirrhosis.  Please clinically correlate.    Electronically signed by: Ayden Mccormick MD  Date:    08/02/2023  Time:    12:39       All pertinent labs and imaging reviewed.    Assessment:       1. Elevated ferritin    2. B12 deficiency    3. Folate deficiency      # Mild anemia with elevated ferritin  - less likely XOCHITL and more likely due to his underlying liver/alcohol consumption   - discussed in details; nutritional work up significant for B12/folate deficiency, MM negative   -  ultrasound of the liver, concerning for cirrhosis,explain to patient in details, patient quit drinking and is taking his B12 and folate supplements , will need to repeat those blood work, as well as his Liver US for monitoring his cirrhosis      # ADD: on medication, following up with pcp   #HTN: on medication, stable,   Plan:     Elevated ferritin  -     US Abdomen Limited; Future; Expected date: 08/08/2023  -     B-12; Future; Expected date:  08/08/2023  -     FOLATE; Future; Expected date: 08/08/2023    B12 deficiency  -     US Abdomen Limited; Future; Expected date: 08/08/2023  -     B-12; Future; Expected date: 08/08/2023  -     FOLATE; Future; Expected date: 08/08/2023    Folate deficiency  -     US Abdomen Limited; Future; Expected date: 08/08/2023  -     B-12; Future; Expected date: 08/08/2023  -     FOLATE; Future; Expected date: 08/08/2023                 Patient queried and all questions were answered.    Recommend Advance Care planning/ directives /living will/patient's wishes  being documented.    Recommend COVID guidelines being followed   Recommend  exercise, nutrition and weight management and health maintenance activities and follow-up with PCPs recommendations       Route Chart for Scheduling    Med Onc Chart Routing      Follow up with physician    Follow up with THELMA 3 months. Ultrasound of the liver, B12/Folate prior to next thelma   Infusion scheduling note    Injection scheduling note    Labs    Imaging    Pharmacy appointment    Other referrals                   Signed:  Cherelle Felipe MD  Hematology and Oncology  [facility]  [facility]    Answers submitted by the patient for this visit:  Review of Systems Questionnaire (Submitted on 7/11/2023)  appetite change : No  unexpected weight change: No  mouth sores: No    Answers submitted by the patient for this visit:  Review of Systems Questionnaire (Submitted on 8/8/2023)  appetite change : No  unexpected weight change: No  mouth sores: No

## 2023-08-11 ENCOUNTER — PATIENT MESSAGE (OUTPATIENT)
Dept: HEMATOLOGY/ONCOLOGY | Facility: CLINIC | Age: 43
End: 2023-08-11
Payer: COMMERCIAL

## 2023-08-20 ENCOUNTER — PATIENT MESSAGE (OUTPATIENT)
Dept: HEMATOLOGY/ONCOLOGY | Facility: CLINIC | Age: 43
End: 2023-08-20
Payer: COMMERCIAL

## 2023-10-25 ENCOUNTER — PATIENT MESSAGE (OUTPATIENT)
Dept: HEMATOLOGY/ONCOLOGY | Facility: CLINIC | Age: 43
End: 2023-10-25
Payer: COMMERCIAL

## 2023-10-27 PROBLEM — E55.9 VITAMIN D DEFICIENCY: Status: ACTIVE | Noted: 2023-10-27

## 2023-10-27 PROBLEM — E83.119 HEMOCHROMATOSIS: Status: ACTIVE | Noted: 2023-10-27

## 2023-10-27 PROBLEM — R68.82 LOW LIBIDO: Status: ACTIVE | Noted: 2023-10-27

## 2023-10-27 PROBLEM — Z87.891 PERSONAL HISTORY OF TOBACCO USE, PRESENTING HAZARDS TO HEALTH: Status: RESOLVED | Noted: 2020-02-04 | Resolved: 2023-10-27

## 2023-11-01 ENCOUNTER — HOSPITAL ENCOUNTER (OUTPATIENT)
Dept: RADIOLOGY | Facility: HOSPITAL | Age: 43
Discharge: HOME | End: 2023-11-01
Attending: STUDENT IN AN ORGANIZED HEALTH CARE EDUCATION/TRAINING PROGRAM
Payer: COMMERCIAL

## 2023-11-01 DIAGNOSIS — E53.8 B12 DEFICIENCY: ICD-10-CM

## 2023-11-01 DIAGNOSIS — E53.8 FOLATE DEFICIENCY: ICD-10-CM

## 2023-11-01 DIAGNOSIS — R79.89 ELEVATED FERRITIN: ICD-10-CM

## 2023-11-01 PROCEDURE — 76705 ECHO EXAM OF ABDOMEN: CPT | Mod: 26,,, | Performed by: RADIOLOGY

## 2023-11-01 PROCEDURE — 76705 US ABDOMEN LIMITED: ICD-10-PCS | Mod: 26,,, | Performed by: RADIOLOGY

## 2023-11-01 PROCEDURE — 76705 ECHO EXAM OF ABDOMEN: CPT | Mod: TC,PO

## 2023-11-08 ENCOUNTER — OFFICE VISIT (OUTPATIENT)
Dept: HEMATOLOGY/ONCOLOGY | Facility: CLINIC | Age: 43
End: 2023-11-08
Payer: COMMERCIAL

## 2023-11-08 DIAGNOSIS — E53.8 FOLATE DEFICIENCY: ICD-10-CM

## 2023-11-08 DIAGNOSIS — R79.89 ELEVATED FERRITIN: Primary | ICD-10-CM

## 2023-11-08 PROCEDURE — 99213 OFFICE O/P EST LOW 20 MIN: CPT | Mod: 95,,, | Performed by: NURSE PRACTITIONER

## 2023-11-08 PROCEDURE — 4010F ACE/ARB THERAPY RXD/TAKEN: CPT | Mod: CPTII,95,, | Performed by: NURSE PRACTITIONER

## 2023-11-08 PROCEDURE — 3044F HG A1C LEVEL LT 7.0%: CPT | Mod: CPTII,95,, | Performed by: NURSE PRACTITIONER

## 2023-11-08 PROCEDURE — 3044F PR MOST RECENT HEMOGLOBIN A1C LEVEL <7.0%: ICD-10-PCS | Mod: CPTII,95,, | Performed by: NURSE PRACTITIONER

## 2023-11-08 PROCEDURE — 99213 PR OFFICE/OUTPT VISIT, EST, LEVL III, 20-29 MIN: ICD-10-PCS | Mod: 95,,, | Performed by: NURSE PRACTITIONER

## 2023-11-08 PROCEDURE — 4010F PR ACE/ARB THEARPY RXD/TAKEN: ICD-10-PCS | Mod: CPTII,95,, | Performed by: NURSE PRACTITIONER

## 2023-11-08 NOTE — PROGRESS NOTES
Subjective:     [place]  The chief complaint leading to consultation is: discussed his blood results     Visit type: audiovisual    Face to Face time with patient: 20 minutes of total time spent on the encounter, which includes face to face time and non-face to face time preparing to see the patient (eg, review of tests), Obtaining and/or reviewing separately obtained history, Documenting clinical information in the electronic or other health record, Independently interpreting results (not separately reported) and communicating results to the patient/family/caregiver, or Care coordination (not separately reported).     Each patient to whom he or she provides medical services by telemedicine is:  (1) informed of the relationship between the physician and patient and the respective role of any other health care provider with respect to management of the patient; and (2) notified that he or she may decline to receive medical services by telemedicine and may withdraw from such care at any time.    Name: Jaylon Crocker  : 1980  MRN: [id number]    CC:  Review of lab    HPI:   Seen by Dr. Felipe:  Mr. Crocker was referred for anemia but with elevated ferritin, ~ 500's, on further discussion patient had generalized weakness and fatigued, we discussed in details his symptoms, no bleeding, no bruising, but he does have a significant history of drinking alcohol every other week.     Today, 23  Patient reports that he stopped drinking for 3 months.  Actively dipping tobacco during the visit.  Reviewed labs work:  Folate levels high:  will change folic acid to every other day; B12 level was good - continue  Mr. Crocker inquired about drinking over the Thanksgiving holiday & watching football.  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  Discussed  effects of alcohol on the liver as well as high liver concentration (stores) in the liver.  Reviewed US Abd on 11/01/23.       Past Medical History:   Diagnosis Date    ADHD (attention deficit hyperactivity disorder)     Hypertension        Past Surgical History:   Procedure Laterality Date    MYRINGOTOMY W/ TUBES      ROBOT-ASSISTED LAPAROSCOPIC REPAIR OF INGUINAL HERNIA USING DA KORI XI Left 3/28/2023    Procedure: XI ROBOTIC REPAIR, HERNIA, INGUINAL;  Surgeon: Gerardo Scott MD;  Location: [facility];  Service: General;  Laterality: Left;       Family History   Problem Relation Age of Onset    Cancer Mother     Hyperlipidemia Father     No Known Problems Sister     No Known Problems Brother        Social History     Socioeconomic History    Marital status:    Tobacco Use    Smoking status: Former     Current packs/day: 0.50     Types: Cigarettes     Passive exposure: Never    Smokeless tobacco: Current     Types: Snuff   Substance and Sexual Activity    Alcohol use: Not Currently     Alcohol/week: 6.0 standard drinks of alcohol     Types: 6 Cans of beer per week    Drug use: Never    Sexual activity: Yes     Partners: Female       Review of patient's allergies indicates:  No Known Allergies    Review of Systems   Constitutional: Negative for decreased appetite.   Eyes:  Negative for visual disturbance.   Cardiovascular:  Negative for chest pain.   Respiratory:  Negative for cough and shortness of breath.    Hematologic/Lymphatic: Negative for adenopathy.   Skin:  Negative for rash.   Musculoskeletal:  Negative for back pain.   Gastrointestinal:  Negative for abdominal pain and diarrhea.   Genitourinary:  Negative for frequency.   Neurological:  Negative for headaches.   Psychiatric/Behavioral:  The patient is not nervous/anxious.             Objective:     There were no vitals filed for this visit.  Virtual visit     Physical Exam  HENT:      Head: Normocephalic.   Pulmonary:      Effort: Pulmonary  effort is normal.   Neurological:      Mental Status: He is alert and oriented to person, place, and time.   Psychiatric:         Thought Content: Thought content normal.               Current Outpatient Medications on File Prior to Visit   Medication Sig    amLODIPine (NORVASC) 10 MG tablet Take 1 tablet (10 mg total) by mouth once daily.    carvediloL (COREG) 6.25 MG tablet TAKE ONE TABLET BY MOUTH TWICE DAILY WITH meals    celecoxib (CELEBREX) 100 MG capsule Take 1 capsule (100 mg total) by mouth 2 (two) times daily.    cyanocobalamin, vitamin B-12, 1,000 mcg Subl Place 1,000 mcg under the tongue once daily.    dextroamphetamine-amphetamine 30 mg Tab Take 1 tablet (30 mg total) by mouth 2 (two) times a day.    ergocalciferol (ERGOCALCIFEROL) 50,000 unit Cap TAKE ONE CAPSULE BY MOUTH EVERY 7 DAYS    folic acid (FOLVITE) 1 MG tablet Take 1 tablet (1 mg total) by mouth once daily.    losartan (COZAAR) 100 MG tablet Take 1 tablet (100 mg total) by mouth once daily.    omeprazole (PRILOSEC) 20 MG capsule TAKE ONE CAPSULE BY MOUTH ONCE DAILY    tadalafiL (CIALIS) 20 MG Tab Take 1 tablet (20 mg total) by mouth once daily.    VITAMIN B-12 1000 MCG tablet Take 1,000 mcg by mouth once daily.     Current Facility-Administered Medications on File Prior to Visit   Medication    cyanocobalamin injection 1,000 mcg       CBC:  Lab Results   Component Value Date    WBC 4.89 07/11/2023    HGB 14.1 07/11/2023    HCT 39.6 (L) 07/11/2023    MCV 92 07/11/2023     07/11/2023         CMP:  Sodium   Date Value Ref Range Status   07/11/2023 137 136 - 145 mmol/L Final     Potassium   Date Value Ref Range Status   07/11/2023 4.7 3.5 - 5.1 mmol/L Final     Chloride   Date Value Ref Range Status   07/11/2023 101 95 - 110 mmol/L Final     CO2   Date Value Ref Range Status   07/11/2023 26 23 - 29 mmol/L Final     Glucose   Date Value Ref Range Status   07/11/2023 94 70 - 110 mg/dL Final     BUN   Date Value Ref Range Status   07/11/2023  13 6 - 20 mg/dL Final     Creatinine   Date Value Ref Range Status   07/11/2023 1.2 0.5 - 1.4 mg/dL Final     Calcium   Date Value Ref Range Status   07/11/2023 9.4 8.7 - 10.5 mg/dL Final     Total Protein   Date Value Ref Range Status   07/11/2023 7.4 6.0 - 8.4 g/dL Final     Albumin   Date Value Ref Range Status   07/11/2023 4.0 3.5 - 5.2 g/dL Final     Total Bilirubin   Date Value Ref Range Status   07/11/2023 0.4 0.1 - 1.0 mg/dL Final     Comment:     For infants and newborns, interpretation of results should be based  on gestational age, weight and in agreement with clinical  observations.    Premature Infant recommended reference ranges:  Up to 24 hours.............<8.0 mg/dL  Up to 48 hours............<12.0 mg/dL  3-5 days..................<15.0 mg/dL  6-29 days.................<15.0 mg/dL       Alkaline Phosphatase   Date Value Ref Range Status   07/11/2023 58 55 - 135 U/L Final     AST   Date Value Ref Range Status   07/11/2023 20 10 - 40 U/L Final     ALT   Date Value Ref Range Status   07/11/2023 22 10 - 44 U/L Final     Anion Gap   Date Value Ref Range Status   07/11/2023 10 8 - 16 mmol/L Final     eGFR if    Date Value Ref Range Status   04/17/2018 >60 >60 mL/min/1.73 m^2 Final     eGFR if non    Date Value Ref Range Status   04/17/2018 >60 >60 mL/min/1.73 m^2 Final     Comment:     Calculation used to obtain the estimated glomerular filtration  rate (eGFR) is the CKD-EPI equation.          Lab Results   Component Value Date    IRON 166 (H) 06/19/2023    TIBC 283 06/19/2023    FERRITIN 392 (H) 07/11/2023       Lab Results   Component Value Date    [id number] 532 11/01/2023   ,  Lab Results   Component Value Date    FOLATE >40.0 (H) 11/01/2023       US Abdomen Limited  Narrative: EXAMINATION:  US ABDOMEN LIMITED    CLINICAL HISTORY:  Other specified abnormal findings of blood chemistry    TECHNIQUE:  Limited ultrasound of the right upper quadrant of the abdomen (including  pancreas, liver, gallbladder, IVC, common bile duct, and spleen) was performed.    COMPARISON:  Ultrasound 08/02/2023.    FINDINGS:  Liver: Borderline enlarged measuring 17.6 cm.  Findings suggestive of subtle surface nodularity, nonspecific.  Somewhat mildly coarsened echotexture similar to the prior exam.  No focal hepatic lesions.  Main portal vein is patent.    Gallbladder: No calculi, wall thickening, or pericholecystic fluid.  No sonographic Freed's sign.    Biliary system: The common duct is not dilated, measuring 2.7 mm.  No intrahepatic ductal dilatation.    Spleen: Normal in size and echotexture, measuring 10.4 x 3.9 cm.    Pancreas: Visualized portions of the pancreas are within normal limits.    IVC: Visualized portions of the IVC are within normal limits.    Miscellaneous: No upper abdominal ascites.  Impression: 1. Stable exam compared to 08/02/2023.  Borderline hepatomegaly and findings suggestive of mild surface nodularity which is nonspecific but could be seen in the setting of cirrhotic morphology.  Clinical correlation needed.  No focal liver lesion by ultrasound.    Electronically signed by: Christian Patel  Date:    11/01/2023  Time:    09:43       All pertinent labs and imaging reviewed.    Assessment:       1. Elevated ferritin    2. Folate deficiency      # Mild anemia with elevated ferritin  - less likely XOCHITL and more likely due to his underlying liver/alcohol consumption   - discussed in details; nutritional work up significant for B12/folate deficiency, MM negative   -  ultrasound of the liver, concerning for cirrhosis,explain to patient in details, patient quit drinking and is taking his B12 and folate supplements , will need to repeat those blood work, as well as his Liver US for monitoring his cirrhosis    11/08/23:  Continue B12 supplementation; change folic acid 1 mg to every other day  Labs tbd on 11/22/23 @ [facility]:  CBC, Ferritin, Iron studies - phone review    #  ADD: on medication, following up with pcp   #HTN: on medication, stable,   Plan:     Elevated ferritin  -     CBC Auto Differential; Future; Expected date: 11/08/2023  -     Iron and TIBC; Future; Expected date: 11/08/2023  -     FERRITIN; Future; Expected date: 11/08/2023    Folate deficiency            27 minutes were spent in coordination of patient's care, record review and counseling.    Route Chart for Scheduling    Med Onc Chart Routing      Follow up with physician    Follow up with R[facility] . Phone review labs results on 11/22   Infusion scheduling note    Injection scheduling note    Labs   Scheduling:  Preferred lab:  Lab interval:  Labs on 11/22/23 @ [facility] on [place]:  CBC, Iron studies/ferritin - phone review   Imaging    Pharmacy appointment    Other referrals                            Answers submitted by the patient for this visit:  Review of Systems Questionnaire (Submitted on 11/8/2023)  appetite change : No  unexpected weight change: No  mouth sores: No

## 2024-01-24 ENCOUNTER — TELEPHONE (OUTPATIENT)
Dept: HEMATOLOGY/ONCOLOGY | Facility: CLINIC | Age: 44
End: 2024-01-24
Payer: COMMERCIAL

## 2024-01-24 NOTE — TELEPHONE ENCOUNTER
Returned call to pt and he stated he is est pt with Dr Felipe.  Pt has a new referral for the year and needs f/u visit.  Pt requesting to see Dr Elkins.  Scheduled pt and confirmed the appt date time and location.

## 2024-01-24 NOTE — TELEPHONE ENCOUNTER
----- Message from Hellen Contreras, Patient Care Assistant sent at 1/24/2024  1:25 PM CST -----  Type: Needs Medical Advice  Who Called:  jessy Robbins Call Back Number: [phone]    Additional Information: jessy has referral for HEMATOLOGY / ONCOLOGY and ready to schedule; please call to further discuss. Thank you .

## 2024-03-15 DIAGNOSIS — E53.8 B12 DEFICIENCY: ICD-10-CM

## 2024-03-15 DIAGNOSIS — R79.89 ELEVATED FERRITIN: Primary | ICD-10-CM

## 2024-03-17 NOTE — PROGRESS NOTES
PATIENT: Jaylon Crocker  MRN: [id number]  DATE: 3/18/2024      Diagnosis:   1. Anemia, unspecified type    2. B12 deficiency    3. Elevated ferritin    4. Hepatomegaly        Chief Complaint: Elevated ferritin  (Former Dr. Felipe pt; 4 month follow up)      Oncologic History:      Oncologic History     Oncologic Treatment     Pathology           Subjective:    Initial History: Mr. Crocker is a 43 y.o. male with ADHD, HTN, B12 deficiency who presents for follow up of anemia.  Labs on 07/11/2023 showed no monoclonal proteins on SPEP or ROSS.  Free light chain assay showed no elevations in lambda kappa light chain.  Folate, copper, LDH, and haptoglobin on 07/11/2023 were normal.  B12 was low on 07/11/2023 the patient was started on B12 supplementation.  The patient has been followed 5 elevated ferritin level.  The patient states he was initially drinking extremely large amounts of alcohol but his ferritin was initially found to be elevated.  Since the last clinic visit the patient underwent ultrasound of the abdomen 11/01/2023 showing borderline enlargement of the liver measuring 17.6 cm with findings suggestive of subtle surface nodularity concerning for potential cirrhosis.  The patient denies CP, cough, SOB, abdominal pain, nausea, vomiting, constipation, diarrhea.  The patient denies fever, chills, night sweats, weight loss, new lumps or bumps, easy bruising or bleeding.    Past Medical History:   Past Medical History:   Diagnosis Date    ADHD (attention deficit hyperactivity disorder)     Hypertension        Past Surgical HIstory:   Past Surgical History:   Procedure Laterality Date    MYRINGOTOMY W/ TUBES      ROBOT-ASSISTED LAPAROSCOPIC REPAIR OF INGUINAL HERNIA USING DA KORI XI Left 3/28/2023    Procedure: XI ROBOTIC REPAIR, HERNIA, INGUINAL;  Surgeon: Gerardo Scott MD;  Location: [facility];  Service: General;  Laterality: Left;       Family History:   Family History   Problem Relation Age of Onset     Cancer Mother     Hyperlipidemia Father     No Known Problems Sister     No Known Problems Brother        Social History:  reports that he has quit smoking. His smoking use included cigarettes. He has never been exposed to tobacco smoke. His smokeless tobacco use includes snuff. He reports that he does not currently use alcohol after a past usage of about 6.0 standard drinks of alcohol per week. He reports that he does not use drugs.    Allergies:  Review of patient's allergies indicates:  No Known Allergies    Medications:  Current Outpatient Medications   Medication Sig Dispense Refill    amLODIPine (NORVASC) 10 MG tablet Take 1 tablet (10 mg total) by mouth once daily. 90 tablet 1    carvediloL (COREG) 6.25 MG tablet TAKE ONE TABLET BY MOUTH TWICE DAILY WITH meals 180 tablet 1    cyanocobalamin, vitamin B-12, 1,000 mcg Subl Place 1,000 mcg under the tongue once daily. 90 tablet 3    dextroamphetamine-amphetamine 30 mg Tab Take 1 tablet (30 mg total) by mouth 2 (two) times a day. 60 tablet 0    ergocalciferol (ERGOCALCIFEROL) 50,000 unit Cap Take 1 capsule (50,000 Units total) by mouth every 7 days. 12 capsule 0    folic acid (FOLVITE) 1 MG tablet Take 1 tablet (1 mg total) by mouth once daily. 90 tablet 3    losartan (COZAAR) 100 MG tablet Take 1 tablet (100 mg total) by mouth once daily. 90 tablet 1    omeprazole (PRILOSEC) 20 MG capsule Take 1 capsule (20 mg total) by mouth once daily. 90 capsule 0    tadalafiL (CIALIS) 20 MG Tab Take 1 tablet (20 mg total) by mouth once daily. 30 tablet 3    VITAMIN B-12 1000 MCG tablet Take 1,000 mcg by mouth once daily.       Current Facility-Administered Medications   Medication Dose Route Frequency Provider Last Rate Last Admin    cyanocobalamin injection 1,000 mcg  1,000 mcg Intramuscular Once Cherelle Felipe MD           Review of Systems   Constitutional:  Negative for chills, diaphoresis, fatigue, fever and unexpected weight change.   Respiratory:  Negative for cough  "and shortness of breath.    Cardiovascular:  Negative for chest pain and palpitations.   Gastrointestinal:  Negative for abdominal pain, constipation, diarrhea, nausea and vomiting.   Skin:  Negative for color change and rash.   Neurological:  Negative for headaches.   Hematological:  Negative for adenopathy. Does not bruise/bleed easily.       ECOG Performance Status: 0   Objective:      Vitals:   Vitals:    03/18/24 1430   BP: 128/88   BP Location: Left arm   Patient Position: Sitting   BP Method: Large (Manual)   Pulse: 86   Resp: 14   Temp: 97.4 °F (36.3 °C)   TempSrc: Temporal   SpO2: 98%   Weight: 93.8 kg (206 lb 12.7 oz)   Height: 5' 7" (1.702 m)     Physical Exam  Constitutional:       General: He is not in acute distress.     Appearance: He is well-developed. He is not diaphoretic.   HENT:      Head: Normocephalic and atraumatic.   Cardiovascular:      Rate and Rhythm: Normal rate and regular rhythm.      Heart sounds: Normal heart sounds. No murmur heard.     No friction rub. No gallop.   Pulmonary:      Effort: Pulmonary effort is normal. No respiratory distress.      Breath sounds: Normal breath sounds. No wheezing or rales.   Chest:      Chest wall: No tenderness.   Abdominal:      General: Bowel sounds are normal. There is no distension.      Palpations: Abdomen is soft. There is no mass.      Tenderness: There is no abdominal tenderness. There is no rebound.   Musculoskeletal:      Cervical back: Normal range of motion.   Lymphadenopathy:      Cervical: No cervical adenopathy.      Upper Body:      Right upper body: No supraclavicular or axillary adenopathy.      Left upper body: No supraclavicular or axillary adenopathy.   Skin:     General: Skin is warm and dry.      Findings: No erythema or rash.   Neurological:      Mental Status: He is alert and oriented to person, place, and time.   Psychiatric:         Behavior: Behavior normal.         Laboratory Data:  Lab Visit on 03/15/2024   Component Date " Value Ref Range Status    WBC 03/15/2024 5.00  3.90 - 12.70 K/uL Final    RBC 03/15/2024 4.80  4.60 - 6.20 M/uL Final    Hemoglobin 03/15/2024 13.8 (L)  14.0 - 18.0 g/dL Final    Hematocrit 03/15/2024 42.7  40.0 - 54.0 % Final    MCV 03/15/2024 89  82 - 98 fL Final    MCH 03/15/2024 28.8  27.0 - 31.0 pg Final    MCHC 03/15/2024 32.3  32.0 - 36.0 g/dL Final    RDW 03/15/2024 13.8  11.5 - 14.5 % Final    Platelets 03/15/2024 446  150 - 450 K/uL Final    MPV 03/15/2024 10.4  9.2 - 12.9 fL Final    Immature Granulocytes 03/15/2024 0.2  0.0 - 0.5 % Final    Gran # (ANC) 03/15/2024 2.5  1.8 - 7.7 K/uL Final    Immature Grans (Abs) 03/15/2024 0.01  0.00 - 0.04 K/uL Final    Comment: Mild elevation in immature granulocytes is non specific and   can be seen in a variety of conditions including stress response,   acute inflammation, trauma and pregnancy. Correlation with other   laboratory and clinical findings is essential.      Lymph # 03/15/2024 1.7  1.0 - 4.8 K/uL Final    Mono # 03/15/2024 0.3  0.3 - 1.0 K/uL Final    Eos # 03/15/2024 0.4  0.0 - 0.5 K/uL Final    Baso # 03/15/2024 0.06  0.00 - 0.20 K/uL Final    nRBC 03/15/2024 0  0 /100 WBC Final    Gran % 03/15/2024 49.4  38.0 - 73.0 % Final    Lymph % 03/15/2024 34.2  18.0 - 48.0 % Final    Mono % 03/15/2024 6.6  4.0 - 15.0 % Final    Eosinophil % 03/15/2024 8.4 (H)  0.0 - 8.0 % Final    Basophil % 03/15/2024 1.2  0.0 - 1.9 % Final    Differential Method 03/15/2024 Automated   Final    Sodium 03/15/2024 137  136 - 145 mmol/L Final    Potassium 03/15/2024 4.7  3.5 - 5.1 mmol/L Final    Anion Gap reference range revised on 4/28/2023    Chloride 03/15/2024 103  95 - 110 mmol/L Final    CO2 03/15/2024 25  22 - 31 mmol/L Final    Glucose 03/15/2024 85  70 - 110 mg/dL Final    Comment: The ADA recommends the following guidelines for fasting glucose:    Normal:       less than 100 mg/dL    Prediabetes:  100 mg/dL to 125 mg/dL    Diabetes:     126 mg/dL or higher      BUN  03/15/2024 23 (H)  9 - 21 mg/dL Final    Creatinine 03/15/2024 0.93  0.50 - 1.40 mg/dL Final    Calcium 03/15/2024 9.6  8.4 - 10.2 mg/dL Final    Total Protein 03/15/2024 7.2  6.0 - 8.4 g/dL Final    Albumin 03/15/2024 4.4  3.5 - 5.2 g/dL Final    Total Bilirubin 03/15/2024 0.7  0.2 - 1.3 mg/dL Final    Alkaline Phosphatase 03/15/2024 57  38 - 145 U/L Final    AST 03/15/2024 30  17 - 59 U/L Final    ALT 03/15/2024 24  0 - 50 U/L Final    Anion Gap 03/15/2024 9  5 - 12 mmol/L Final    Anion Gap reference range revised on 4/28/2023    eGFR 03/15/2024 >60  >60 mL/min/1.73 m^2 Final    Ferritin 03/15/2024 108  18 - 464 ng/mL Final    Comment: WARNING: Heterophilic antibodies in the serum or plasma of   certain individuals are known to cause interference with   immunoassays. These antibodies may be present in blood samples   from individuals regularly exposed to animals or who have been   treated with animal serum products.     Note: Patients taking very high Biotin doses of >300 mcg/day may   cause a negative bias in this assay.      Iron 03/15/2024 137  45 - 160 ug/dL Final    TIBC 03/15/2024 287  261 - 462 ug/dL Final    Iron Saturation 03/15/2024 48  20 - 50 % Final    Vitamin B-12 03/15/2024 837  210 - 950 pg/mL Final    Comment: Patients taking very high Biotin doses of >300 mcg/day may   cause a positive bias in this assay.           Imaging:     US Abdomen 11/01/23  Liver: Borderline enlarged measuring 17.6 cm. Findings suggestive of subtle surface nodularity, nonspecific. Somewhat mildly coarsened echotexture similar to the prior exam. No focal hepatic lesions. Main portal vein is patent.     Gallbladder: No calculi, wall thickening, or pericholecystic fluid. No sonographic Freed's sign.     Biliary system: The common duct is not dilated, measuring 2.7 mm. No intrahepatic ductal dilatation.     Spleen: Normal in size and echotexture, measuring 10.4 x 3.9 cm.     Pancreas: Visualized portions of the pancreas are  within normal limits.     IVC: Visualized portions of the IVC are within normal limits.     Miscellaneous: No upper abdominal ascites       Assessment:       1. Anemia, unspecified type    2. B12 deficiency    3. Elevated ferritin    4. Hepatomegaly           Plan:     Normocytic Anemia - hemoglobin is 13.8 grams/deciliter on 03/15/2024  -Unclear etiology  -Labs on 07/11/2023 showed no monoclonal proteins on SPEP or ROSS.  Free light chain assay showed no elevations in lambda kappa light chain.  Folate, copper, LDH, and haptoglobin on 07/11/2023 were normal  -No improvement with treatment of B12 deficiency  -Could be due to underlying liver disease   -Will monitor    B12 Deficiency - seen on labs 07/11/2023  -Patient on a 1000 mcg daily by mouth   -Repeat B12 level normal   -Monitor    Elevated Ferritin - felt to be due to heavy alcohol use previously  -Patient states his alcohol uses diminished  -Repeat ferritin on 03/15/2024 is normal  -No further monitoring at this time    Hepatomegaly - patient with ultrasound 11/01/2023 suggestive of potential cirrhosis and borderline hepatomegaly   -Patient to see hepatology  -Concern for hepatomegaly secondary to alcohol abuse previously    Route Chart for Scheduling    Med Onc Chart Routing      Follow up with physician 6 months. Pt needs a CBC and CMP in 6 months.  Pt needs an appt with hepatology for hepatomegaly.   Follow up with RENATE    Infusion scheduling note    Injection scheduling note    Labs    Imaging    Pharmacy appointment    Other referrals                       Jacob Elkins MD  [facility]  Hematology and Oncology  [facility]   [address] [facility] [place] [address]   O: [phone]  F: [phone]

## 2024-03-18 ENCOUNTER — OFFICE VISIT (OUTPATIENT)
Dept: HEMATOLOGY/ONCOLOGY | Facility: CLINIC | Age: 44
End: 2024-03-18
Payer: COMMERCIAL

## 2024-03-18 ENCOUNTER — TELEPHONE (OUTPATIENT)
Dept: HEMATOLOGY/ONCOLOGY | Facility: CLINIC | Age: 44
End: 2024-03-18
Payer: COMMERCIAL

## 2024-03-18 VITALS
HEIGHT: 67 IN | WEIGHT: 206.81 LBS | SYSTOLIC BLOOD PRESSURE: 128 MMHG | OXYGEN SATURATION: 98 % | TEMPERATURE: 97 F | RESPIRATION RATE: 14 BRPM | DIASTOLIC BLOOD PRESSURE: 88 MMHG | BODY MASS INDEX: 32.46 KG/M2 | HEART RATE: 86 BPM

## 2024-03-18 DIAGNOSIS — E53.8 B12 DEFICIENCY: ICD-10-CM

## 2024-03-18 DIAGNOSIS — R79.89 ELEVATED FERRITIN: ICD-10-CM

## 2024-03-18 DIAGNOSIS — D64.9 ANEMIA, UNSPECIFIED TYPE: Primary | ICD-10-CM

## 2024-03-18 DIAGNOSIS — R16.0 HEPATOMEGALY: ICD-10-CM

## 2024-03-18 PROCEDURE — 3079F DIAST BP 80-89 MM HG: CPT | Mod: CPTII,S$GLB,, | Performed by: INTERNAL MEDICINE

## 2024-03-18 PROCEDURE — 99213 OFFICE O/P EST LOW 20 MIN: CPT | Mod: S$GLB,,, | Performed by: INTERNAL MEDICINE

## 2024-03-18 PROCEDURE — 99999 PR PBB SHADOW E&M-EST. PATIENT-LVL IV: CPT | Mod: PBBFAC,,, | Performed by: INTERNAL MEDICINE

## 2024-03-18 PROCEDURE — 3074F SYST BP LT 130 MM HG: CPT | Mod: CPTII,S$GLB,, | Performed by: INTERNAL MEDICINE

## 2024-03-18 PROCEDURE — 4010F ACE/ARB THERAPY RXD/TAKEN: CPT | Mod: CPTII,S$GLB,, | Performed by: INTERNAL MEDICINE

## 2024-03-18 PROCEDURE — 3008F BODY MASS INDEX DOCD: CPT | Mod: CPTII,S$GLB,, | Performed by: INTERNAL MEDICINE

## 2024-03-19 ENCOUNTER — PATIENT MESSAGE (OUTPATIENT)
Dept: HEMATOLOGY/ONCOLOGY | Facility: CLINIC | Age: 44
End: 2024-03-19
Payer: COMMERCIAL

## 2024-05-06 ENCOUNTER — OFFICE VISIT (OUTPATIENT)
Dept: HEPATOLOGY | Facility: CLINIC | Age: 44
End: 2024-05-06
Payer: COMMERCIAL

## 2024-05-06 DIAGNOSIS — R93.2 ABNORMAL FINDINGS ON DIAGNOSTIC IMAGING OF LIVER: ICD-10-CM

## 2024-05-06 DIAGNOSIS — Z87.898 HISTORY OF ALCOHOL USE: ICD-10-CM

## 2024-05-06 DIAGNOSIS — E66.09 CLASS 1 OBESITY DUE TO EXCESS CALORIES WITH SERIOUS COMORBIDITY AND BODY MASS INDEX (BMI) OF 31.0 TO 31.9 IN ADULT: ICD-10-CM

## 2024-05-06 DIAGNOSIS — R16.0 HEPATOMEGALY: Primary | ICD-10-CM

## 2024-05-06 PROCEDURE — 4010F ACE/ARB THERAPY RXD/TAKEN: CPT | Mod: CPTII,95,, | Performed by: NURSE PRACTITIONER

## 2024-05-06 PROCEDURE — 99204 OFFICE O/P NEW MOD 45 MIN: CPT | Mod: 95,,, | Performed by: NURSE PRACTITIONER

## 2024-05-06 PROCEDURE — 1160F RVW MEDS BY RX/DR IN RCRD: CPT | Mod: CPTII,95,, | Performed by: NURSE PRACTITIONER

## 2024-05-06 PROCEDURE — 1159F MED LIST DOCD IN RCRD: CPT | Mod: CPTII,95,, | Performed by: NURSE PRACTITIONER

## 2024-05-06 RX ORDER — VALACYCLOVIR HYDROCHLORIDE 500 MG/1
500 TABLET, FILM COATED ORAL
COMMUNITY
Start: 2024-04-05

## 2024-05-06 NOTE — PROGRESS NOTES
The patient location is: [place]  The chief complaint leading to consultation is: Abnormal diagnostic imaging    Visit type: audiovisual    Face to Face time with patient: 20  45 minutes of total time spent on the encounter, which includes face to face time and non-face to face time preparing to see the patient (eg, review of tests), Obtaining and/or reviewing separately obtained history, Documenting clinical information in the electronic or other health record, Independently interpreting results (not separately reported) and communicating results to the patient/family/caregiver, or Care coordination (not separately reported).     Each patient to whom he or she provides medical services by telemedicine is:  (1) informed of the relationship between the physician and patient and the respective role of any other health care provider with respect to management of the patient; and (2) notified that he or she may decline to receive medical services by telemedicine and may withdraw from such care at any time.    Notes:     [facility] New Patient Visit    Reason for Visit:  Abnormal diagnostic imaging    PCP: Maryjo Rod    HPI:  This is a 43 y.o. male with PMH noted below, here for evaluation of abnormal diagnostic imaging, referred by Hematology. He has a 25 year history of heavy alcohol use, and was originally referred to Hematology for an elevated ferritin level (normalized now, due to reduction in alcohol intake).     The patient's risk factors for fatty liver disease include:     Overweight/Obesity                     Yes  Dyslipidemia                                No- Refer to most recent lipid panel results below:   Latest Reference Range & Units 05/17/23 10:21   Cholesterol Total 120 - 199 mg/dL 246 (H)   HDL 40 - 75 mg/dL 87 (H)   HDL/Cholesterol Ratio 20.0 - 50.0 % 35.4   Non-HDL Cholesterol mg/dL 159   Total Cholesterol/HDL Ratio 2.0 - 5.0  2.8   Triglycerides 30 - 150 mg/dL 96    LDL Cholesterol 63.0 - 159.0 mg/dL 139.8     Insulin resistance/Diabetes         No; Last HgbA1c was 5.6% (5/2023)    LFT's have been historically normal, based on chart review.     Most recent Abdominal US in 11/2023 showed:    US Abdomen Limited  Narrative: EXAMINATION:  US ABDOMEN LIMITED    CLINICAL HISTORY:  Other specified abnormal findings of blood chemistry    TECHNIQUE:  Limited ultrasound of the right upper quadrant of the abdomen (including pancreas, liver, gallbladder, IVC, common bile duct, and spleen) was performed.    COMPARISON:  Ultrasound 08/02/2023.    FINDINGS:  Liver: Borderline enlarged measuring 17.6 cm.  Findings suggestive of subtle surface nodularity, nonspecific.  Somewhat mildly coarsened echotexture similar to the prior exam.  No focal hepatic lesions.  Main portal vein is patent.    Gallbladder: No calculi, wall thickening, or pericholecystic fluid.  No sonographic Freed's sign.    Biliary system: The common duct is not dilated, measuring 2.7 mm.  No intrahepatic ductal dilatation.    Spleen: Normal in size and echotexture, measuring 10.4 x 3.9 cm.    Pancreas: Visualized portions of the pancreas are within normal limits.    IVC: Visualized portions of the IVC are within normal limits.    Miscellaneous: No upper abdominal ascites.  Impression: 1. Stable exam compared to 08/02/2023.  Borderline hepatomegaly and findings suggestive of mild surface nodularity which is nonspecific but could be seen in the setting of cirrhotic morphology.  Clinical correlation needed.  No focal liver lesion by ultrasound.    Electronically signed by: Christian Patel  Date:    11/01/2023  Time:    09:43    He has never undergone a liver biopsy or non-invasive staging exam.    FIB-4 Calculation: 0.59 at 5/6/2024 10:40 AM   Calculated from:  Last SGOT/AST : 30 at 5/6/2024 10:40 AM  Last SGPT/ALT: 24 at 5/6/2024 10:40 AM   Platelets: 446 at 5/6/2024 10:40 AM   Age: 43 y.o.     FIB-4 below 1.30 is considered as  low-risk for advanced fibrosis  FIB-4 over 2.67 is considered as high-risk for advanced fibrosis  FIB-4 values between 1.30 and 2.67 are considered as intermediate-risk of advanced fibrosis for ages 36-64.     For ages > 64 the cut-off for low-risk goes to < 2.  This is a screening tool and clinical judgement should be used in the interpretation of these results.    He has no known family history of liver disease. He does not use illicit drugs. He is well appearing, and denies any signs or symptoms of hepatic decompensation including jaundice, dark urine, pruritus, abdominal distention, lower extremity edema, hematemesis, melena, or periods of confusion suggestive of hepatic encephalopathy.      PMHX:  has a past medical history of ADHD (attention deficit hyperactivity disorder) and Hypertension.    PSHX:  has a past surgical history that includes Myringotomy w/ tubes and Robot-assisted laparoscopic repair of inguinal hernia using da Gloria Xi (Left, 3/28/2023).    The patient's social and family histories were reviewed by me and updated in the appropriate section of the electronic medical record.    Review of patient's allergies indicates:  No Known Allergies    Current Outpatient Medications on File Prior to Visit   Medication Sig Dispense Refill    valACYclovir (VALTREX) 500 MG tablet Take 500 mg by mouth.      amLODIPine (NORVASC) 10 MG tablet Take 1 tablet (10 mg total) by mouth once daily. 90 tablet 1    carvediloL (COREG) 6.25 MG tablet TAKE ONE TABLET BY MOUTH TWICE DAILY WITH meals 180 tablet 1    cyanocobalamin, vitamin B-12, 1,000 mcg Subl Place 1,000 mcg under the tongue once daily. 90 tablet 3    dextroamphetamine-amphetamine 30 mg Tab Take 1 tablet (30 mg total) by mouth 2 (two) times a day. 60 tablet 0    ergocalciferol (ERGOCALCIFEROL) 50,000 unit Cap Take 1 capsule (50,000 Units total) by mouth every 7 days. 12 capsule 0    folic acid (FOLVITE) 1 MG tablet Take 1 tablet (1 mg total) by mouth once  daily. 90 tablet 3    losartan (COZAAR) 100 MG tablet Take 1 tablet (100 mg total) by mouth once daily. 90 tablet 1    omeprazole (PRILOSEC) 20 MG capsule Take 1 capsule (20 mg total) by mouth once daily. 90 capsule 0    omeprazole (PRILOSEC) 20 MG capsule TAKE ONE CAPSULE BY MOUTH ONCE DAILY 90 capsule 0    tadalafiL (CIALIS) 20 MG Tab Take 1 tablet (20 mg total) by mouth once daily. 30 tablet 3    VITAMIN B-12 1000 MCG tablet Take 1,000 mcg by mouth once daily.       Current Facility-Administered Medications on File Prior to Visit   Medication Dose Route Frequency Provider Last Rate Last Admin    cyanocobalamin injection 1,000 mcg  1,000 mcg Intramuscular Once Cherelle Felipe MD           SOCIAL HISTORY:   Social History     Tobacco Use   Smoking Status Former    Current packs/day: 0.50    Types: Cigarettes    Passive exposure: Never   Smokeless Tobacco Current    Types: Snuff       Social History     Substance and Sexual Activity   Alcohol Use Not Currently    Alcohol/week: 6.0 standard drinks of alcohol    Types: 6 Cans of beer per week       Social History     Substance and Sexual Activity   Drug Use Never       ROS:   GENERAL: Denies fever, chills, weight loss/gain, fatigue  HEENT: Denies headaches, dizziness, vision/hearing changes  CARDIOVASCULAR: Denies chest pain, palpitations, or edema  RESPIRATORY: Denies dyspnea, cough  GI: Denies abdominal pain, rectal bleeding, nausea, vomiting. No change in bowel pattern or color  : Denies dysuria, hematuria   SKIN: Denies rash, itching   NEURO: Denies confusion, memory loss, or mood changes  PSYCH: Denies depression or anxiety  HEME/LYMPH: Denies easy bruising or bleeding    Objective Findings:    PHYSICAL EXAM:   Friendly White male, in no acute distress; alert and oriented to person, place and time  VITALS: There were no vitals taken for this visit.  HENT: Normocephalic, without obvious abnormality. Oral mucosa pink and moist. Dentition good.  EYES: Sclerae  anicteric.   NECK: No obvious masses.  CARDIOVASCULAR: No peripheral edema, per patient report.   RESPIRATORY: Normal respiratory effort.   GI: AMANDA.  EXTREMITIES: No peripheral edema, per patient report.  SKIN: No jaundice. No rashes noted to exposed skin.   NEURO:  Normal gait.   PSYCH:  Memory intact. Thought and speech pattern appropriate. Behavior normal. No depression or anxiety noted.    DIAGNOSTIC STUDIES:    US Abdomen Limited  Narrative: EXAMINATION:  US ABDOMEN LIMITED    CLINICAL HISTORY:  Other specified abnormal findings of blood chemistry    TECHNIQUE:  Limited ultrasound of the right upper quadrant of the abdomen (including pancreas, liver, gallbladder, IVC, common bile duct, and spleen) was performed.    COMPARISON:  Ultrasound 08/02/2023.    FINDINGS:  Liver: Borderline enlarged measuring 17.6 cm.  Findings suggestive of subtle surface nodularity, nonspecific.  Somewhat mildly coarsened echotexture similar to the prior exam.  No focal hepatic lesions.  Main portal vein is patent.    Gallbladder: No calculi, wall thickening, or pericholecystic fluid.  No sonographic Freed's sign.    Biliary system: The common duct is not dilated, measuring 2.7 mm.  No intrahepatic ductal dilatation.    Spleen: Normal in size and echotexture, measuring 10.4 x 3.9 cm.    Pancreas: Visualized portions of the pancreas are within normal limits.    IVC: Visualized portions of the IVC are within normal limits.    Miscellaneous: No upper abdominal ascites.  Impression: 1. Stable exam compared to 08/02/2023.  Borderline hepatomegaly and findings suggestive of mild surface nodularity which is nonspecific but could be seen in the setting of cirrhotic morphology.  Clinical correlation needed.  No focal liver lesion by ultrasound.    Electronically signed by: Christian Patel  Date:    11/01/2023  Time:    09:43    FIBROSCAN - Ordered at visit.    ASSESSMENT & PLAN:  43 y.o. White male with:    1. Hepatomegaly  Ambulatory  referral/consult to Hepatology    FibroScan Transplant Hepatology(Vibration Controlled Transient Elastography)    Hepatic Function Panel    Hepatitis A antibody, IgG    Hepatitis B Core Antibody, Total    Hepatitis B Surface Antibody, Qual/Quant    Hepatitis B Surface Antigen    Hepatitis C Antibody    Phosphatidylethanol (PETH)      2. Abnormal findings on diagnostic imaging of liver  Phosphatidylethanol (PETH)      3. History of alcohol use  FibroScan Transplant Hepatology(Vibration Controlled Transient Elastography)    Hepatic Function Panel    Hepatitis A antibody, IgG    Hepatitis B Core Antibody, Total    Hepatitis B Surface Antibody, Qual/Quant    Hepatitis B Surface Antigen    Hepatitis C Antibody    Phosphatidylethanol (PETH)      4. Class 1 obesity due to excess calories with serious comorbidity and body mass index (BMI) of 31.0 to 31.9 in adult  Phosphatidylethanol (PETH)        - Recommend total abstinence from alcohol.  - Schedule Fibroscan to stage liver disease.  - Repeat liver function tests now.  - Will also check immunity markers for Hepatitis A and B and arrange for vaccination if needed.  - Recommend a referral to a nutritionist to discuss dietary changes (patient declined).  - Recommend addiitional weight loss goal of 20 lbs, through diet and exercise.  - Recommend good control of cholesterol, blood pressure, & blood sugar levels.    Follow up in about 4 weeks (around 6/3/2024). with Fibroscan and labs before visit.    Thank you for allowing me to participate in the care of Jaylon Crocker       Hepatology Nurse Practitioner  [facility]    CC'ed note to:   Jacob Elkins MD Mascherpa-Kerkow, Nathalie, MD

## 2024-05-06 NOTE — Clinical Note
Please contact patient to schedule labs in [place], and Fibroscan now at main campus, along with a f/u visit with me to review results (virtual visit okay). Thank you.

## 2024-05-07 ENCOUNTER — TELEPHONE (OUTPATIENT)
Dept: HEPATOLOGY | Facility: CLINIC | Age: 44
End: 2024-05-07
Payer: COMMERCIAL

## 2024-05-07 NOTE — TELEPHONE ENCOUNTER
----- Message from Karolina Carlos NP sent at 5/6/2024 11:43 AM CDT -----  Please contact patient to schedule labs in [place], and Fibroscan now at main [place], along with a f/u visit with me to review results (virtual visit okay). Thank you.

## 2024-05-08 ENCOUNTER — TELEPHONE (OUTPATIENT)
Dept: HEPATOLOGY | Facility: CLINIC | Age: 44
End: 2024-05-08
Payer: COMMERCIAL

## 2024-05-08 NOTE — TELEPHONE ENCOUNTER
----- Message from Karolina Carlos NP sent at 5/6/2024 11:43 AM CDT -----  Please contact patient to schedule labs in [place], and Fibroscan now at main [place], along with a f/u visit with me to review results (virtual visit okay). Thank you.

## 2024-05-08 NOTE — TELEPHONE ENCOUNTER
Called patient to schedule labs and fibroscan. Patient stated he will be out of town. He asked me to call him back on 5/29.

## 2024-05-30 ENCOUNTER — PATIENT MESSAGE (OUTPATIENT)
Dept: HEPATOLOGY | Facility: CLINIC | Age: 44
End: 2024-05-30
Payer: COMMERCIAL

## 2024-05-31 ENCOUNTER — LAB VISIT (OUTPATIENT)
Dept: LAB | Facility: HOSPITAL | Age: 44
End: 2024-05-31
Payer: COMMERCIAL

## 2024-05-31 ENCOUNTER — PROCEDURE VISIT (OUTPATIENT)
Dept: HEPATOLOGY | Facility: CLINIC | Age: 44
End: 2024-05-31
Payer: COMMERCIAL

## 2024-05-31 DIAGNOSIS — Z87.898 HISTORY OF ALCOHOL USE: ICD-10-CM

## 2024-05-31 DIAGNOSIS — E66.09 CLASS 1 OBESITY DUE TO EXCESS CALORIES WITH SERIOUS COMORBIDITY AND BODY MASS INDEX (BMI) OF 31.0 TO 31.9 IN ADULT: ICD-10-CM

## 2024-05-31 DIAGNOSIS — R16.0 HEPATOMEGALY: ICD-10-CM

## 2024-05-31 DIAGNOSIS — R93.2 ABNORMAL FINDINGS ON DIAGNOSTIC IMAGING OF LIVER: ICD-10-CM

## 2024-05-31 LAB
ALBUMIN SERPL BCP-MCNC: 4.1 G/DL (ref 3.5–5.2)
ALP SERPL-CCNC: 61 U/L (ref 55–135)
ALT SERPL W/O P-5'-P-CCNC: 23 U/L (ref 10–44)
AST SERPL-CCNC: 19 U/L (ref 10–40)
BILIRUB DIRECT SERPL-MCNC: 0.2 MG/DL (ref 0.1–0.3)
BILIRUB SERPL-MCNC: 0.7 MG/DL (ref 0.1–1)
HAV IGG SER QL IA: NORMAL
HBV CORE AB SERPL QL IA: NORMAL
HBV SURFACE AG SERPL QL IA: NORMAL
HCV AB SERPL QL IA: NORMAL
PROT SERPL-MCNC: 7 G/DL (ref 6–8.4)

## 2024-05-31 PROCEDURE — 86706 HEP B SURFACE ANTIBODY: CPT | Performed by: NURSE PRACTITIONER

## 2024-05-31 PROCEDURE — 87340 HEPATITIS B SURFACE AG IA: CPT | Performed by: NURSE PRACTITIONER

## 2024-05-31 PROCEDURE — 86704 HEP B CORE ANTIBODY TOTAL: CPT | Performed by: NURSE PRACTITIONER

## 2024-05-31 PROCEDURE — 86790 VIRUS ANTIBODY NOS: CPT | Performed by: NURSE PRACTITIONER

## 2024-05-31 PROCEDURE — 91200 LIVER ELASTOGRAPHY: CPT | Mod: S$GLB,,, | Performed by: NURSE PRACTITIONER

## 2024-05-31 PROCEDURE — 86803 HEPATITIS C AB TEST: CPT | Performed by: NURSE PRACTITIONER

## 2024-05-31 PROCEDURE — 80321 ALCOHOLS BIOMARKERS 1OR 2: CPT | Performed by: NURSE PRACTITIONER

## 2024-05-31 PROCEDURE — 80076 HEPATIC FUNCTION PANEL: CPT | Performed by: NURSE PRACTITIONER

## 2024-06-03 ENCOUNTER — PATIENT MESSAGE (OUTPATIENT)
Dept: HEPATOLOGY | Facility: CLINIC | Age: 44
End: 2024-06-03
Payer: COMMERCIAL

## 2024-06-03 LAB
HBV SURFACE AB SER QL IA: NEGATIVE
HBV SURFACE AB SERPL IA-ACNC: <3 MIU/ML

## 2024-06-03 NOTE — PROCEDURES
FibroScan Transplant Hepatology(Vibration Controlled Transient Elastography)    Date/Time: 5/31/2024 1:30 PM    Performed by: Karolina Carlos NP  Authorized by: Karolina Carlos NP    Diagnosis:  NAFLD    Probe:  XL    Universal Protocol: Patient's identity, procedure and site were verified, confirmatory pause was performed.  Discussed procedure including risks and potential complications.  Questions answered.  Patient verbalizes understanding and wishes to proceed with VCTE.     Procedure: After providing explanations of the procedure, patient was placed in the supine position with right arm in maximum abduction to allow optimal exposure of right lateral abdomen.  Patient was briefly assessed, Testing was performed in the mid-axillary location, 50Hz Shear Wave pulses were applied and the resulting Shear Wave and Propagation Speed detected with a 3.5 MHz ultrasonic signal, using the FibroScan probe, Skin to liver capsule distance and liver parenchyma were accessed during the entire examination with the FibroScan probe, Patient was instructed to breathe normally and to abstain from sudden movements during the procedure, allowing for random measurements of liver stiffness. At least 10 Shear Waves were produced, Individual measurements of each Shear Wave were calculated.  Patient tolerated the procedure well with no complications.  Meets discharge criteria as was dismissed.  Rates pain 0 out of 10.  Patient will follow up with ordering provider to review results.    Findings  Median liver stiffness score:  4.7  CAP Reading: dB/m:  202    IQR/med %:  9  Interpretation  Fibrosis interpretation is based on medial liver stiffness - Kilopascal (kPa).    Fibrosis Stage:  F 0-1  Steatosis interpretation is based on controlled attenuation parameter - (dB/m).    Steatosis Grade:  <S1

## 2024-06-05 LAB
CLINICAL BIOCHEMIST REVIEW: NORMAL
PLPETH BLD-MCNC: <10 NG/ML
POPETH BLD-MCNC: 10 NG/ML

## 2024-07-29 DIAGNOSIS — F98.8 ATTENTION DEFICIT DISORDER (ADD) WITHOUT HYPERACTIVITY: ICD-10-CM

## 2024-07-29 DIAGNOSIS — R79.89 ELEVATED FERRITIN: ICD-10-CM

## 2024-07-29 DIAGNOSIS — E66.09 CLASS 1 OBESITY DUE TO EXCESS CALORIES WITH SERIOUS COMORBIDITY AND BODY MASS INDEX (BMI) OF 31.0 TO 31.9 IN ADULT: ICD-10-CM

## 2024-07-29 DIAGNOSIS — D64.9 ANEMIA, UNSPECIFIED TYPE: ICD-10-CM

## 2024-07-29 DIAGNOSIS — E53.8 B12 DEFICIENCY DUE TO DIET: ICD-10-CM

## 2024-07-29 RX ORDER — FOLIC ACID 1 MG/1
1 TABLET ORAL DAILY
Qty: 90 TABLET | Refills: 3 | Status: SHIPPED | OUTPATIENT
Start: 2024-07-29

## 2024-08-15 ENCOUNTER — PATIENT MESSAGE (OUTPATIENT)
Dept: HEPATOLOGY | Facility: CLINIC | Age: 44
End: 2024-08-15
Payer: COMMERCIAL

## 2024-08-26 ENCOUNTER — PATIENT MESSAGE (OUTPATIENT)
Dept: HEPATOLOGY | Facility: CLINIC | Age: 44
End: 2024-08-26
Payer: COMMERCIAL

## 2024-09-03 ENCOUNTER — TELEPHONE (OUTPATIENT)
Dept: HEPATOLOGY | Facility: CLINIC | Age: 44
End: 2024-09-03
Payer: COMMERCIAL

## 2024-09-03 NOTE — TELEPHONE ENCOUNTER
Called the patient to reschedule appt per Ms. Turcios.  Scheduled to the next available 9/9/2024.  Patient confirmed and agreed with the appt.  Reminder letter mailed

## 2024-09-09 ENCOUNTER — OFFICE VISIT (OUTPATIENT)
Dept: HEPATOLOGY | Facility: CLINIC | Age: 44
End: 2024-09-09
Payer: COMMERCIAL

## 2024-09-09 ENCOUNTER — PATIENT MESSAGE (OUTPATIENT)
Dept: HEPATOLOGY | Facility: CLINIC | Age: 44
End: 2024-09-09

## 2024-09-09 DIAGNOSIS — I10 ESSENTIAL HYPERTENSION: ICD-10-CM

## 2024-09-09 DIAGNOSIS — F10.11 HISTORY OF ALCOHOL ABUSE: ICD-10-CM

## 2024-09-09 DIAGNOSIS — R16.0 HEPATOMEGALY: Primary | ICD-10-CM

## 2024-09-09 DIAGNOSIS — E66.9 CLASS 1 OBESITY WITH BODY MASS INDEX (BMI) OF 33.0 TO 33.9 IN ADULT, UNSPECIFIED OBESITY TYPE, UNSPECIFIED WHETHER SERIOUS COMORBIDITY PRESENT: ICD-10-CM

## 2024-09-09 PROCEDURE — 1159F MED LIST DOCD IN RCRD: CPT | Mod: CPTII,95,, | Performed by: NURSE PRACTITIONER

## 2024-09-09 PROCEDURE — 4010F ACE/ARB THERAPY RXD/TAKEN: CPT | Mod: CPTII,95,, | Performed by: NURSE PRACTITIONER

## 2024-09-09 PROCEDURE — 99214 OFFICE O/P EST MOD 30 MIN: CPT | Mod: 95,,, | Performed by: NURSE PRACTITIONER

## 2024-09-09 PROCEDURE — 1160F RVW MEDS BY RX/DR IN RCRD: CPT | Mod: CPTII,95,, | Performed by: NURSE PRACTITIONER

## 2024-09-09 RX ORDER — DOXYCYCLINE 100 MG/1
100 CAPSULE ORAL
COMMUNITY
Start: 2024-09-05 | End: 2024-09-19

## 2024-09-09 RX ORDER — OXYCODONE HYDROCHLORIDE 5 MG/1
5 TABLET ORAL EVERY 6 HOURS PRN
COMMUNITY
Start: 2024-09-05 | End: 2024-09-12

## 2024-09-09 RX ORDER — PROMETHAZINE HYDROCHLORIDE 25 MG/1
25 TABLET ORAL EVERY 6 HOURS PRN
COMMUNITY
Start: 2024-09-05 | End: 2024-09-19

## 2024-09-09 RX ORDER — ACETAMINOPHEN 500 MG
1000 TABLET ORAL EVERY 6 HOURS PRN
COMMUNITY
Start: 2024-09-05 | End: 2024-09-12

## 2024-09-09 NOTE — Clinical Note
Please contact patient to schedule US and Fibroscan in 5/2025 with RTC a few days later to review results. Thanks!

## 2024-09-09 NOTE — PROGRESS NOTES
The patient location is: [place]  The chief complaint leading to consultation is: Abnormal diagnostic imaging    Visit type: audiovisual    Face to Face time with patient: 18  30 minutes of total time spent on the encounter, which includes face to face time and non-face to face time preparing to see the patient (eg, review of tests), Obtaining and/or reviewing separately obtained history, Documenting clinical information in the electronic or other health record, Independently interpreting results (not separately reported) and communicating results to the patient/family/caregiver, or Care coordination (not separately reported).     Each patient to whom he or she provides medical services by telemedicine is:  (1) informed of the relationship between the physician and patient and the respective role of any other health care provider with respect to management of the patient; and (2) notified that he or she may decline to receive medical services by telemedicine and may withdraw from such care at any time.    Notes:     [facility] Established Patient Visit    Reason for Visit:  Abnormal diagnostic imaging    PCP: Maryjo Rod    HPI:  This is a 43 y.o. male with PMH noted below, here for follow up for abnormal diagnostic imaging, referred by Hematology. He has a 25 year history of heavy alcohol use, and was originally referred to Hematology for an elevated ferritin level (normalized now, due to reduction in alcohol intake).     The patient's risk factors for fatty liver disease include:     Overweight/Obesity                     Yes  Dyslipidemia                                No - Refer to most recent lipid panel results below:   Latest Reference Range & Units 05/17/23 10:21   Cholesterol Total 120 - 199 mg/dL 246 (H)   HDL 40 - 75 mg/dL 87 (H)   HDL/Cholesterol Ratio 20.0 - 50.0 % 35.4   Non-HDL Cholesterol mg/dL 159   Total Cholesterol/HDL Ratio 2.0 - 5.0  2.8   Triglycerides 30 - 150  mg/dL 96   LDL Cholesterol 63.0 - 159.0 mg/dL 139.8     Insulin resistance/Diabetes         No; Last HgbA1c was 5.6% (5/2023)    LFT's have been historically normal, based on chart review.     Abdominal US in 11/2023 showed:    US Abdomen Limited  Narrative: EXAMINATION:  US ABDOMEN LIMITED    CLINICAL HISTORY:  Other specified abnormal findings of blood chemistry    TECHNIQUE:  Limited ultrasound of the right upper quadrant of the abdomen (including pancreas, liver, gallbladder, IVC, common bile duct, and spleen) was performed.    COMPARISON:  Ultrasound 08/02/2023.    FINDINGS:  Liver: Borderline enlarged measuring 17.6 cm.  Findings suggestive of subtle surface nodularity, nonspecific.  Somewhat mildly coarsened echotexture similar to the prior exam.  No focal hepatic lesions.  Main portal vein is patent.    Gallbladder: No calculi, wall thickening, or pericholecystic fluid.  No sonographic Freed's sign.    Biliary system: The common duct is not dilated, measuring 2.7 mm.  No intrahepatic ductal dilatation.    Spleen: Normal in size and echotexture, measuring 10.4 x 3.9 cm.    Pancreas: Visualized portions of the pancreas are within normal limits.    IVC: Visualized portions of the IVC are within normal limits.    Miscellaneous: No upper abdominal ascites.  Impression: 1. Stable exam compared to 08/02/2023.  Borderline hepatomegaly and findings suggestive of mild surface nodularity which is nonspecific but could be seen in the setting of cirrhotic morphology.  Clinical correlation needed.  No focal liver lesion by ultrasound.    Electronically signed by: Christian Patel  Date:    11/01/2023  Time:    09:43    He has never undergone a liver biopsy.    FIB-4 Calculation: 0.59 at 5/6/2024 10:40 AM   Calculated from:  Last SGOT/AST : 19 at 9/9/2024 10:06 AM  Last SGPT/ALT: 24 at 5/6/2024 10:40 AM   Platelets: 446 at 9/9/2024 10:06 AM   Age: 43 y.o.     FIB-4 below 1.30 is considered as low-risk for advanced  fibrosis  FIB-4 over 2.67 is considered as high-risk for advanced fibrosis  FIB-4 values between 1.30 and 2.67 are considered as intermediate-risk of advanced fibrosis for ages 36-64.     For ages > 64 the cut-off for low-risk goes to < 2.  This is a screening tool and clinical judgement should be used in the interpretation of these results.    He has no known family history of liver disease. He does not use illicit drugs. He has significantly reduced his alcohol intake over the past year, now consuming alcohol only a few times per month. Fibroscan to non-invasively stage his liver disease in 5/2024 was suggestive of minimal fatty infiltration of the liver (<S1), with F0-F1 fibrosis and a low likelihood of cirrhosis.    He is well appearing, and has no signs or symptoms of hepatic decompensation including jaundice, dark urine, pruritus, abdominal distention, lower extremity edema, hematemesis, melena, or periods of confusion suggestive of hepatic encephalopathy.      PMHX:  has a past medical history of ADHD (attention deficit hyperactivity disorder) and Hypertension.    PSHX:  has a past surgical history that includes Myringotomy w/ tubes and Robot-assisted laparoscopic repair of inguinal hernia using da Gloria Xi (Left, 3/28/2023).    The patient's social and family histories were reviewed by me and updated in the appropriate section of the electronic medical record.    Review of patient's allergies indicates:  No Known Allergies    Current Outpatient Medications on File Prior to Visit   Medication Sig Dispense Refill    acetaminophen (TYLENOL) 500 MG tablet Take 1,000 mg by mouth every 6 (six) hours as needed.      doxycycline (MONODOX) 100 MG capsule Take 100 mg by mouth.      oxyCODONE (ROXICODONE) 5 MG immediate release tablet Take 5 mg by mouth every 6 (six) hours as needed.      promethazine (PHENERGAN) 25 MG tablet Take 25 mg by mouth every 6 (six) hours as needed.      amLODIPine (NORVASC) 10 MG tablet Take  1 tablet (10 mg total) by mouth once daily. 90 tablet 1    carvediloL (COREG) 6.25 MG tablet TAKE ONE TABLET BY MOUTH TWICE DAILY WITH meals 180 tablet 1    cyanocobalamin, vitamin B-12, 1,000 mcg Subl Place 1,000 mcg under the tongue once daily. 90 tablet 0    dextroamphetamine-amphetamine 30 mg Tab Take 1 tablet (30 mg total) by mouth 2 (two) times a day. 60 tablet 0    ergocalciferol (ERGOCALCIFEROL) 50,000 unit Cap TAKE ONE CAPSULE BY MOUTH EVERY 7 DAYS 12 capsule 1    folic acid (FOLVITE) 1 MG tablet Take 1 tablet (1 mg total) by mouth once daily. 90 tablet 3    losartan (COZAAR) 100 MG tablet Take 1 tablet (100 mg total) by mouth once daily. 90 tablet 1    omeprazole (PRILOSEC) 20 MG capsule TAKE ONE CAPSULE BY MOUTH ONCE DAILY 90 capsule 0    omeprazole (PRILOSEC) 20 MG capsule TAKE ONE CAPSULE BY MOUTH ONCE DAILY 90 capsule 1    tadalafiL (CIALIS) 20 MG Tab Take 1 tablet (20 mg total) by mouth once daily. 30 tablet 3    VITAMIN B-12 1000 MCG tablet Take 1,000 mcg by mouth once daily.      [DISCONTINUED] valACYclovir (VALTREX) 500 MG tablet Take 500 mg by mouth.       Current Facility-Administered Medications on File Prior to Visit   Medication Dose Route Frequency Provider Last Rate Last Admin    cyanocobalamin injection 1,000 mcg  1,000 mcg Intramuscular Once Cherelle eFlipe MD         SOCIAL HISTORY:   Social History     Tobacco Use   Smoking Status Former    Current packs/day: 0.50    Types: Cigarettes    Passive exposure: Never   Smokeless Tobacco Current    Types: Snuff     Social History     Substance and Sexual Activity   Alcohol Use Not Currently    Alcohol/week: 6.0 standard drinks of alcohol    Types: 6 Cans of beer per week     Social History     Substance and Sexual Activity   Drug Use Never     ROS:   GENERAL: Denies fever, chills, weight loss/gain, fatigue  HEENT: Denies headaches, dizziness, vision/hearing changes  CARDIOVASCULAR: Denies chest pain, palpitations, or edema  RESPIRATORY: Denies  dyspnea, cough  GI: Denies abdominal pain, rectal bleeding, nausea, vomiting. No change in bowel pattern or color  : Denies dysuria, hematuria   SKIN: Denies rash, itching   NEURO: Denies confusion, memory loss, or mood changes  PSYCH: Denies depression or anxiety  HEME/LYMPH: Denies easy bruising or bleeding    Objective Findings:    PHYSICAL EXAM:   Friendly White male, in no acute distress; alert and oriented to person, place and time  VITALS: There were no vitals taken for this visit.  HENT: Normocephalic, without obvious abnormality. Oral mucosa pink and moist. Dentition good.  EYES: Sclerae anicteric.   NECK: No obvious masses.  CARDIOVASCULAR: AMANDA.  RESPIRATORY: Normal respiratory effort.   GI: AMANDA.  EXTREMITIES: AMANDA.  SKIN: No jaundice. No rashes noted to exposed skin.   NEURO:  No asterixis.    PSYCH:  Memory intact. Thought and speech pattern appropriate. Behavior normal. No depression or anxiety noted.    DIAGNOSTIC STUDIES:    US Abdomen Limited  Narrative: EXAMINATION:  US ABDOMEN LIMITED    CLINICAL HISTORY:  Other specified abnormal findings of blood chemistry    TECHNIQUE:  Limited ultrasound of the right upper quadrant of the abdomen (including pancreas, liver, gallbladder, IVC, common bile duct, and spleen) was performed.    COMPARISON:  Ultrasound 08/02/2023.    FINDINGS:  Liver: Borderline enlarged measuring 17.6 cm.  Findings suggestive of subtle surface nodularity, nonspecific.  Somewhat mildly coarsened echotexture similar to the prior exam.  No focal hepatic lesions.  Main portal vein is patent.    Gallbladder: No calculi, wall thickening, or pericholecystic fluid.  No sonographic Freed's sign.    Biliary system: The common duct is not dilated, measuring 2.7 mm.  No intrahepatic ductal dilatation.    Spleen: Normal in size and echotexture, measuring 10.4 x 3.9 cm.    Pancreas: Visualized portions of the pancreas are within normal limits.    IVC: Visualized portions of the IVC are within  normal limits.    Miscellaneous: No upper abdominal ascites.  Impression: 1. Stable exam compared to 08/02/2023.  Borderline hepatomegaly and findings suggestive of mild surface nodularity which is nonspecific but could be seen in the setting of cirrhotic morphology.  Clinical correlation needed.  No focal liver lesion by ultrasound.    Electronically signed by: Christian Patel  Date:    11/01/2023  Time:    09:43    FIBROSCAN 5/31/2024:    Findings  Median liver stiffness score:  4.7  CAP Reading: dB/m:  202     IQR/med %:  9  Interpretation  Fibrosis interpretation is based on medial liver stiffness - Kilopascal (kPa).     Fibrosis Stage:  F 0-1  Steatosis interpretation is based on controlled attenuation parameter - (dB/m).     Steatosis Grade:  <S1       ASSESSMENT & PLAN:  43 y.o. White male with:    1. Hepatomegaly  FibroScan Transplant Hepatology(Vibration Controlled Transient Elastography)    US Abdomen Complete      2. History of alcohol abuse  FibroScan Transplant Hepatology(Vibration Controlled Transient Elastography)    US Abdomen Complete      3. Class 1 obesity with body mass index (BMI) of 33.0 to 33.9 in adult, unspecified obesity type, unspecified whether serious comorbidity present  FibroScan Transplant Hepatology(Vibration Controlled Transient Elastography)    US Abdomen Complete      4. Essential hypertension  FibroScan Transplant Hepatology(Vibration Controlled Transient Elastography)    US Abdomen Complete        - Repeat labs as planned next week to monitor blood counts and LFT's.  - Schedule Fibroscan to non-invasively re-stage liver disease in 1 year (due 5/2025).  - Recommend an ultrasound of the liver every 1-2 years.   - Continue to limit alcohol intake, recommend no more than 7 standard drinks per week, ideally less.  - Recommend weight loss goal of 20 lbs, through diet and exercise.  - Recommend good control of cholesterol, blood pressure, & blood sugar levels.    Follow up in about 9 months  (around 6/9/2025). with Fibroscan and US before visit.    Thank you for allowing me to participate in the care of Jaylon Crcoker       Hepatology Nurse Practitioner  [facility]    CC'ed note to:   No ref. provider found  Maryjo Rod MD

## 2025-04-30 ENCOUNTER — PATIENT MESSAGE (OUTPATIENT)
Dept: HEPATOLOGY | Facility: CLINIC | Age: 45
End: 2025-04-30
Payer: COMMERCIAL

## 2025-06-05 PROBLEM — G47.33 OSA (OBSTRUCTIVE SLEEP APNEA): Status: ACTIVE | Noted: 2025-06-05

## 2025-06-05 PROBLEM — R20.2 NUMBNESS AND TINGLING: Status: ACTIVE | Noted: 2025-06-05

## 2025-06-05 PROBLEM — R20.0 NUMBNESS AND TINGLING: Status: ACTIVE | Noted: 2025-06-05

## 2025-06-10 DIAGNOSIS — E53.8 B12 DEFICIENCY DUE TO DIET: ICD-10-CM

## 2025-06-10 DIAGNOSIS — R79.89 ELEVATED FERRITIN: ICD-10-CM

## 2025-06-10 DIAGNOSIS — E66.811 CLASS 1 OBESITY DUE TO EXCESS CALORIES WITH SERIOUS COMORBIDITY AND BODY MASS INDEX (BMI) OF 31.0 TO 31.9 IN ADULT: ICD-10-CM

## 2025-06-10 DIAGNOSIS — F98.8 ATTENTION DEFICIT DISORDER (ADD) WITHOUT HYPERACTIVITY: ICD-10-CM

## 2025-06-10 DIAGNOSIS — D64.9 ANEMIA, UNSPECIFIED TYPE: ICD-10-CM

## 2025-06-10 DIAGNOSIS — E66.09 CLASS 1 OBESITY DUE TO EXCESS CALORIES WITH SERIOUS COMORBIDITY AND BODY MASS INDEX (BMI) OF 31.0 TO 31.9 IN ADULT: ICD-10-CM

## 2025-06-10 RX ORDER — FOLIC ACID 1 MG/1
1 TABLET ORAL DAILY
Qty: 90 TABLET | Refills: 3 | Status: SHIPPED | OUTPATIENT
Start: 2025-06-10